# Patient Record
Sex: FEMALE | Race: WHITE | Employment: FULL TIME | URBAN - METROPOLITAN AREA
[De-identification: names, ages, dates, MRNs, and addresses within clinical notes are randomized per-mention and may not be internally consistent; named-entity substitution may affect disease eponyms.]

---

## 2014-10-01 LAB — COLONOSCOPY, EXTERNAL: NORMAL

## 2017-01-26 ENCOUNTER — OFFICE VISIT (OUTPATIENT)
Dept: FAMILY MEDICINE CLINIC | Age: 57
End: 2017-01-26

## 2017-01-26 VITALS
HEIGHT: 61 IN | TEMPERATURE: 98.9 F | DIASTOLIC BLOOD PRESSURE: 72 MMHG | BODY MASS INDEX: 29.07 KG/M2 | SYSTOLIC BLOOD PRESSURE: 137 MMHG | WEIGHT: 154 LBS | RESPIRATION RATE: 12 BRPM | HEART RATE: 80 BPM | OXYGEN SATURATION: 100 %

## 2017-01-26 DIAGNOSIS — J01.00 ACUTE NON-RECURRENT MAXILLARY SINUSITIS: Primary | ICD-10-CM

## 2017-01-26 DIAGNOSIS — H10.33 ACUTE CONJUNCTIVITIS OF BOTH EYES, UNSPECIFIED ACUTE CONJUNCTIVITIS TYPE: ICD-10-CM

## 2017-01-26 DIAGNOSIS — R05.9 COUGH: ICD-10-CM

## 2017-01-26 RX ORDER — POLYMYXIN B SULFATE AND TRIMETHOPRIM 1; 10000 MG/ML; [USP'U]/ML
1 SOLUTION OPHTHALMIC EVERY 4 HOURS
Qty: 1 BOTTLE | Refills: 0 | Status: SHIPPED | OUTPATIENT
Start: 2017-01-26 | End: 2017-02-05

## 2017-01-26 RX ORDER — CEPHALEXIN 500 MG/1
500 CAPSULE ORAL 2 TIMES DAILY
Qty: 20 CAP | Refills: 0 | Status: SHIPPED | OUTPATIENT
Start: 2017-01-26 | End: 2017-02-05

## 2017-01-26 RX ORDER — BENZONATATE 200 MG/1
200 CAPSULE ORAL
Qty: 30 CAP | Refills: 0 | Status: SHIPPED | OUTPATIENT
Start: 2017-01-26 | End: 2017-05-30

## 2017-01-26 RX ORDER — DESONIDE 0.5 MG/G
OINTMENT TOPICAL 2 TIMES DAILY
COMMUNITY

## 2017-01-26 NOTE — PATIENT INSTRUCTIONS
Pinkeye: Care Instructions  Your Care Instructions    Pinkeye is redness and swelling of the eye surface and the conjunctiva (the lining of the eyelid and the covering of the white part of the eye). Pinkeye is also called conjunctivitis. Pinkeye is often caused by infection with bacteria or a virus. Dry air, allergies, smoke, and chemicals are other common causes. Pinkeye often clears on its own in 7 to 10 days. Antibiotics only help if the pinkeye is caused by bacteria. Pinkeye caused by infection spreads easily. If an allergy or chemical is causing pinkeye, it will not go away unless you can avoid whatever is causing it. Follow-up care is a key part of your treatment and safety. Be sure to make and go to all appointments, and call your doctor if you are having problems. Its also a good idea to know your test results and keep a list of the medicines you take. How can you care for yourself at home? · Wash your hands often. Always wash them before and after you treat pinkeye or touch your eyes or face. · Use moist cotton or a clean, wet cloth to remove crust. Wipe from the inside corner of the eye to the outside. Use a clean part of the cloth for each wipe. · Put cold or warm wet cloths on your eye a few times a day if the eye hurts. · Do not wear contact lenses or eye makeup until the pinkeye is gone. Throw away any eye makeup you were using when you got pinkeye. Clean your contacts and storage case. If you wear disposable contacts, use a new pair when your eye has cleared and it is safe to wear contacts again. · If the doctor gave you antibiotic ointment or eyedrops, use them as directed. Use the medicine for as long as instructed, even if your eye starts looking better soon. Keep the bottle tip clean, and do not let it touch the eye area. · To put in eyedrops or ointment:  ¨ Tilt your head back, and pull your lower eyelid down with one finger.   ¨ Drop or squirt the medicine inside the lower lid.  ¨ Close your eye for 30 to 60 seconds to let the drops or ointment move around. ¨ Do not touch the ointment or dropper tip to your eyelashes or any other surface. · Do not share towels, pillows, or washcloths while you have pinkeye. When should you call for help? Call your doctor now or seek immediate medical care if:  · You have pain in your eye, not just irritation on the surface. · You have a change in vision or loss of vision. · You have an increase in discharge from the eye. · Your eye has not started to improve or begins to get worse within 48 hours after you start using antibiotics. · Pinkeye lasts longer than 7 days. Watch closely for changes in your health, and be sure to contact your doctor if you have any problems. Where can you learn more? Go to http://anabelle-wanda.info/. Enter Y392 in the search box to learn more about \"Pinkeye: Care Instructions. \"  Current as of: May 27, 2016  Content Version: 11.1  © 3840-4963 Healthwise, Incorporated. Care instructions adapted under license by [employer] (which disclaims liability or warranty for this information). If you have questions about a medical condition or this instruction, always ask your healthcare professional. [address] any warranty or liability for your use of this information.

## 2017-01-26 NOTE — PROGRESS NOTES
Here for (L) eye redness since with drainage, irritation has spread to her (R) eye. Is also getting over a URI.

## 2017-01-26 NOTE — MR AVS SNAPSHOT
Visit Information Date & Time Provider Department Dept. Phone Encounter #  
 1/26/2017  3:30 PM Adamaris Morrell NP [address] [facility] [phone] [id number] Follow-up Instructions Return if symptoms worsen or fail to improve. Your Appointments 3/14/2017  9:00 AM  
Any with Latanya Galvez MD  
[address] [facility] Appt Note: 1 yr follow up, bring machine [employer] Himanshu Wynn [id number] [address] [facility] [id number] 5/30/2017  8:30 AM  
ESTABLISHED PATIENT with Willie Martinez MD  
[address] [facility]) Appt Note: 6mo fuv  
 [address] [id number] [phone]  
  
   
 [address] [address] Upcoming Health Maintenance Date Due Pneumococcal 19-64 Medium Risk (1 of 1 - PPSV23) 2/15/1980 DTaP/Tdap/Td series (1 - Tdap) 2/15/1982 EYE EXAM RETINAL OR DILATED Q1 5/28/2016 PAP AKA CERVICAL CYTOLOGY 6/1/2016 BREAST CANCER SCRN MAMMOGRAM 1/6/2017 HEMOGLOBIN A1C Q6M 5/28/2017 FOOT EXAM Q1 11/28/2017 MICROALBUMIN Q1 11/28/2017 LIPID PANEL Q1 11/28/2017 COLONOSCOPY 12/1/2024 Allergies as of 1/26/2017  Review Complete On: 1/26/2017 By: Sabrina Sellers LPN Severity Noted Reaction Type Reactions Amoxicillin High 06/14/2010    Anaphylaxis Empirin  06/14/2010    Other (comments) Numbness and tingling in extremities Lyrica [Pregabalin]  06/14/2010    Other (comments) Caused muenstral cycle to be sporadic Mepergan  06/14/2010    Other (comments) Numbness and tingling of extremities Meperidine  06/14/2010    Other (comments) Numbness and tingling of extremities Promethazine  06/14/2010    Not Reported This Time  
 Numbness and tingling of extremities Asa Ka [Milnacipran]  03/31/2011    Palpitations Current Immunizations  Reviewed on 5/25/2016 No immunizations on file. Not reviewed this visit You Were Diagnosed With   
  
 Codes Comments Acute non-recurrent maxillary sinusitis    -  Primary ICD-10-CM: J01.00 ICD-9-CM: 461.0 Acute conjunctivitis of both eyes, unspecified acute conjunctivitis type     ICD-10-CM: H10.33 ICD-9-CM: 372.00 Cough     ICD-10-CM: R05 ICD-9-CM: 818. 2 Vitals BP Pulse Temp Resp Height(growth percentile) Weight(growth percentile)  
 137/72 80 98.9 °F (37.2 °C) 12 5' 1\" (1.549 m) 154 lb (69.9 kg) LMP SpO2 BMI OB Status Smoking Status 05/07/2015 100% 29.1 kg/m2 Hysterectomy Never Smoker Vitals History BMI and BSA Data Body Mass Index Body Surface Area  
 29.1 kg/m 2 1.73 m 2 Preferred Pharmacy Pharmacy Name Phone [id number] [place], [address] [facility] [phone] Your Updated Medication List  
  
   
This list is accurate as of: 1/26/17  4:13 PM.  Always use your most recent med list.  
  
  
  
  
 albuterol 90 mcg/actuation inhaler Commonly known as:  PROVENTIL HFA, VENTOLIN HFA, PROAIR HFA Take 2 Puffs by inhalation every four (4) hours as needed for Wheezing for up to 360 days. benzonatate 200 mg capsule Commonly known as:  TESSALON Take 1 Cap by mouth three (3) times daily as needed for Cough. cephALEXin 500 mg capsule Commonly known as:  Amedeo Ronde Take 1 Cap by mouth two (2) times a day for 10 days. cetirizine 10 mg tablet Commonly known as:  ZYRTEC  
TAKE 1 TABLET BY MOUTH DAILY. clindinium-chlordiazePOXIDE 5-2.5 mg per capsule Commonly known as:  Abi Fears Take 1 Cap by mouth two (2) times a day. Max Daily Amount: 2 Caps. clobetasol 0.05 % external solution Commonly known as:  TEMOVATE  
APPLY TO AFFECTED AREA TWICE A DAY FOR 30 DAYS DISCARD REMAINDER  
  
 COSAMIN -400 mg tablet Generic drug:  glucosamine-chondroitin Take 1 Tab by mouth two (2) times a day. desonide 0.05 % topical ointment Commonly known as:  Francisco Augustine Apply  to affected area two (2) times a day. escitalopram oxalate 20 mg tablet Commonly known as:  Wyn Jacque Take 1 Tab by mouth daily. FISH OIL PO Take  by mouth. GLUCOSAMINE RELIEF 1,000 mg Tab Generic drug:  glucosamine Take  by mouth.  
  
 metaxalone 800 mg tablet Commonly known as:  SKELAXIN Take 1 Tab by mouth three (3) times daily. MIRALAX PO Take  by mouth nightly. pantoprazole 40 mg tablet Commonly known as:  PROTONIX Take 1 Tab by mouth daily. salicylic acid 6 % Sham  
APPLY EXTERNALLY DAILY. TACLONEX 0.005-0.064 % Susp Generic drug:  Betamethasone-Calcipotriene  
  
 tamoxifen 20 mg tablet Commonly known as:  NOLVADEX TAKE 1 TAB BY MOUTH DAILY. triamcinolone acetonide 0.1 % ointment Commonly known as:  KENALOG Apply  to affected area two (2) times a day. use thin layer  
  
 trimethoprim-polymyxin b ophthalmic solution Commonly known as:  POLYTRIM Administer 1 Drop to both eyes every four (4) hours for 10 days. turmeric-turmeric root extract 450-50 mg Cap Take  by mouth.  
  
 valsartan-hydroCHLOROthiazide 160-12.5 mg per tablet Commonly known as:  DIOVAN-HCT Take 1 Tab by mouth daily. VITAMIN D3 2,000 unit Tab Generic drug:  cholecalciferol (vitamin D3) Take  by mouth. Prescriptions Sent to Pharmacy Refills  
 trimethoprim-polymyxin b (POLYTRIM) ophthalmic solution 0 Sig: Administer 1 Drop to both eyes every four (4) hours for 10 days. Class: Normal  
 Pharmacy: [facility]/pharmacy #[id number] [address] Ph #: [phone] Route: Both Eyes  
 benzonatate (TESSALON) 200 mg capsule 0 Sig: Take 1 Cap by mouth three (3) times daily as needed for Cough. Class: Normal  
 Pharmacy: [facility]/pharmacy #[id number] - [place], [address] [place] [address] Ph #: [phone] Route: Oral  
 cephALEXin (KEFLEX) 500 mg capsule 0 Sig: Take 1 Cap by mouth two (2) times a day for 10 days. Class: Normal  
 Pharmacy: [facility]/pharmacy #[id number] - [place], [address] [facility] #: [phone] Route: Oral  
  
Follow-up Instructions Return if symptoms worsen or fail to improve. Patient Instructions Pinkeye: Care Instructions Your Care Instructions Pinkeye is redness and swelling of the eye surface and the conjunctiva (the lining of the eyelid and the covering of the white part of the eye). Pinkeye is also called conjunctivitis. Pinkeye is often caused by infection with bacteria or a virus. Dry air, allergies, smoke, and chemicals are other common causes. Pinkeye often clears on its own in 7 to 10 days. Antibiotics only help if the pinkeye is caused by bacteria. Pinkeye caused by infection spreads easily. If an allergy or chemical is causing pinkeye, it will not go away unless you can avoid whatever is causing it. Follow-up care is a key part of your treatment and safety. Be sure to make and go to all appointments, and call your doctor if you are having problems. Its also a good idea to know your test results and keep a list of the medicines you take. How can you care for yourself at home? · Wash your hands often. Always wash them before and after you treat pinkeye or touch your eyes or face. · Use moist cotton or a clean, wet cloth to remove crust. Wipe from the inside corner of the eye to the outside. Use a clean part of the cloth for each wipe. · Put cold or warm wet cloths on your eye a few times a day if the eye hurts. · Do not wear contact lenses or eye makeup until the pinkeye is gone. Throw away any eye makeup you were using when you got pinkeye. Clean your contacts and storage case. If you wear disposable contacts, use a new pair when your eye has cleared and it is safe to wear contacts again. · If the doctor gave you antibiotic ointment or eyedrops, use them as directed. Use the medicine for as long as instructed, even if your eye starts looking better soon. Keep the bottle tip clean, and do not let it touch the eye area. · To put in eyedrops or ointment: ¨ Tilt your head back, and pull your lower eyelid down with one finger. ¨ Drop or squirt the medicine inside the lower lid. ¨ Close your eye for 30 to 60 seconds to let the drops or ointment move around. ¨ Do not touch the ointment or dropper tip to your eyelashes or any other surface. · Do not share towels, pillows, or washcloths while you have pinkeye. When should you call for help? Call your doctor now or seek immediate medical care if: 
· You have pain in your eye, not just irritation on the surface. · You have a change in vision or loss of vision. · You have an increase in discharge from the eye. · Your eye has not started to improve or begins to get worse within 48 hours after you start using antibiotics. · Pinkeye lasts longer than 7 days. Watch closely for changes in your health, and be sure to contact your doctor if you have any problems. Where can you learn more? Go to http://anabelle-wanda.info/. Enter Y392 in the search box to learn more about \"Pinkeye: Care Instructions. \" Current as of: May 27, 2016 Content Version: 11.1 © 4064-0076 [employer]. Care instructions adapted under license by [employer] (which disclaims liability or warranty for this information). If you have questions about a medical condition or this instruction, always ask your healthcare professional. [address] any warranty or liability for your use of this information. Introducing [facility] & HEALTH SERVICES! Dear Ari Lambert: Thank you for requesting a Space-Time Insight account. Our records indicate that you already have an active Space-Time Insight account.   You can access your account anytime at [email or web address] Did you know that you can access your hospital and ER discharge instructions at any time in Brandkids? You can also review all of your test results from your hospital stay or ER visit. Additional Information If you have questions, please visit the Frequently Asked Questions section of the Brandkids website at [email or web address] Remember, Brandkids is NOT to be used for urgent needs. For medical emergencies, dial 911. Now available from your iPhone and Android! Please provide this summary of care documentation to your next provider. Your primary care clinician is listed as RODRIGO FERNANDEZ. If you have any questions after today's visit, please call [phone].

## 2017-02-06 LAB — AMB DEXA, EXTERNAL: NORMAL

## 2017-03-06 RX ORDER — ESCITALOPRAM OXALATE 10 MG/1
10 TABLET ORAL DAILY
Qty: 90 TAB | Refills: 1 | Status: SHIPPED | OUTPATIENT
Start: 2017-03-06 | End: 2017-09-06 | Stop reason: SDUPTHER

## 2017-03-14 ENCOUNTER — OFFICE VISIT (OUTPATIENT)
Dept: SLEEP MEDICINE | Age: 57
End: 2017-03-14

## 2017-03-14 ENCOUNTER — DOCUMENTATION ONLY (OUTPATIENT)
Dept: SLEEP MEDICINE | Age: 57
End: 2017-03-14

## 2017-03-14 VITALS
SYSTOLIC BLOOD PRESSURE: 120 MMHG | DIASTOLIC BLOOD PRESSURE: 71 MMHG | HEIGHT: 61 IN | BODY MASS INDEX: 29.45 KG/M2 | OXYGEN SATURATION: 97 % | WEIGHT: 156 LBS | HEART RATE: 80 BPM

## 2017-03-14 DIAGNOSIS — I10 ESSENTIAL HYPERTENSION: ICD-10-CM

## 2017-03-14 DIAGNOSIS — G47.33 OBSTRUCTIVE SLEEP APNEA (ADULT) (PEDIATRIC): Primary | ICD-10-CM

## 2017-03-14 RX ORDER — ESCITALOPRAM OXALATE 20 MG/1
TABLET ORAL
Refills: 5 | COMMUNITY
Start: 2016-12-05 | End: 2017-05-30

## 2017-03-14 NOTE — PROGRESS NOTES
[address] [facility] [place]. [place], [address]  Tel.  [phone]  Fax. 100 [facility] 60  [place], [address] [facility]  Tel.  [phone]  Fax. [phone] [address] [facility] Cosme Roman   Tel.  [phone]  Fax. [phone]     S>Goldie Abel is a 65 y.o. female seen for a positive airway pressure follow-up. She reports no problems using the device. She is 98% compliant over the past 6 months. The following problems are identified:    Drowsiness no Problems exhaling no   Snoring no Forget to put on no   Mask Comfortable No, still hurts nose Can't fall asleep no   Dry Mouth no Mask falls off no   Air Leaking no Frequent awakenings no      Download reviewed. She admits that her sleep has improved. However, still achy and fatigued which she attributes to the fibromyalgia. Allergies   Allergen Reactions    Amoxicillin Anaphylaxis    Empirin Other (comments)     Numbness and tingling in extremities    Lyrica [Pregabalin] Other (comments)     Caused muenstral cycle to be sporadic    Mepergan Other (comments)     Numbness and tingling of extremities    Meperidine Other (comments)     Numbness and tingling of extremities    Promethazine Not Reported This Time     Numbness and tingling of extremities    Savella [Milnacipran] Palpitations       She has a current medication list which includes the following prescription(s): escitalopram oxalate, desonide, clindinium-chlordiazepoxide, triamcinolone acetonide, turmeric-turmeric root extract, salicylic acid, clobetasol, pantoprazole, docosahexanoic acid/epa, tamoxifen, valsartan-hydrochlorothiazide, taclonex, metaxalone, escitalopram oxalate, benzonatate, cholecalciferol (vitamin d3), glucosamine-chondroitin, glucosamine, cetirizine, albuterol, and polyethylene glycol [address]. [facility] She  has a past medical history of Arthritis; Breast atypical hyperplasia (6/14/2010);  Chronic pain; DM (diabetes mellitus) ([facility] [address] (6/14/2010); Fibromyalgia (6/14/2010); GERD (gastroesophageal reflux disease); HTN (hypertension) (6/14/2010); IBS (irritable bowel syndrome) (6/14/2010); Ill-defined condition; MVP (mitral valve prolapse) (6/14/2010); and Retinal detachment. [place] Sleepiness Score: 17   and Modified F.O.S.Q. Score Total / 2: 12      O>    Visit Vitals    /71    Pulse 80    Ht 5' 1\" (1.549 m)    Wt 156 lb (70.8 kg)    LMP 05/07/2015    SpO2 97%    BMI 29.48 kg/m2           General:   Alert, oriented, not in distress   Neck:   No JVD    Chest/Lungs:  symetrical lung expansion , no accessory muscle use    Extremities:  no obvious rashes , negative edema    Neuro:  No focal deficits ; No obvious tremor    Psych:  Normal affect ,  Normal countenance ;         A>    ICD-10-CM ICD-9-CM    1. Obstructive sleep apnea (adult) (pediatric) G47.33 327.23 AMB SUPPLY ORDER     AHI = 10(12-15). On CPAP :  5-10 cmH2O. Compliant:      yes    Therapeutic Response:  Positive    P>      * Follow-up Disposition:  Return in about 1 year (around 3/14/2018). she will continue on her current pressure settings. dme for mask fitting and replacement supplies. * She was asked to contact our office for any problems regarding PAP therapy. * Counseling was provided regarding the importance of regular PAP use and on proper sleep hygiene and safe driving. * Re-enforced proper and regular cleaning for the device. 2. Hypertension - she continues on her current regimen. I have reviewed the relationship between hypertension as it relates to sleep-disordered breathing.      Lawyer Angelo MD  Diplomate in Sleep Medicine  [facility]

## 2017-03-14 NOTE — PATIENT INSTRUCTIONS
[address] [facility] [place]. [place], [address]  Tel.  [phone]  Fax. 100 [facility] 60  [place], [address] [facility] [address]  Tel.  [phone]  Fax. [phone] [address] [facility] Cosme Roman  Tel.  [phone]  Fax. [phone]     PROPER SLEEP HYGIENE    What to avoid  · Do not have drinks with caffeine, such as coffee or black tea, for 8 hours before bed. · Do not smoke or use other types of tobacco near bedtime. Nicotine is a stimulant and can keep you awake. · Avoid drinking alcohol late in the evening, because it can cause you to wake in the middle of the night. · Do not eat a big meal close to bedtime. If you are hungry, eat a light snack. · Do not drink a lot of water close to bedtime, because the need to urinate may wake you up during the night. · Do not read or watch TV in bed. Use the bed only for sleeping and sexual activity. What to try  · Go to bed at the same time every night, and wake up at the same time every morning. Do not take naps during the day. · Keep your bedroom quiet, dark, and cool. · Get regular exercise, but not within 3 to 4 hours of your bedtime. .  · Sleep on a comfortable pillow and mattress. · If watching the clock makes you anxious, turn it facing away from you so you cannot see the time. · If you worry when you lie down, start a worry book. Well before bedtime, write down your worries, and then set the book and your concerns aside. · Try meditation or other relaxation techniques before you go to bed. · If you cannot fall asleep, get up and go to another room until you feel sleepy. Do something relaxing. Repeat your bedtime routine before you go to bed again. · Make your house quiet and calm about an hour before bedtime. Turn down the lights, turn off the TV, log off the computer, and turn down the volume on music. This can help you relax after a busy day.     Drowsy Driving  The [address] [place] [address] Road Traffic Safety Administration cites drowsiness as a causing factor in more than 457,547 police reported crashes annually, resulting in 76,000 injuries and 1,500 deaths. Other surveys suggest 55% of people polled have driven while drowsy in the past year, 23% had fallen asleep but not crashed, 3% crashed, and 2% had and accident due to drowsy driving. Who is at risk? Young Drivers: One study of drowsy driving accidents states that 55% of the drivers were under 25 years. Of those, 75% were male. Shift Workers and Travelers: People who work overnight or travel across time zones frequently are at higher risk of experiencing Circadian Rhythm Disorders. They are trying to work and function when their body is programed to sleep. Sleep Deprived: Lack of sleep has a serious impact on your ability to pay attention or focus on a task. Consistently getting less than the average of 8 hours your body needs creates partial or cumulative sleep deprivation. Untreated Sleep Disorders: Sleep Apnea, Narcolepsy, R.L.S., and other sleep disorders (untreated) prevent a person from getting enough restful sleep. This leads to excessive daytime sleepiness and increases the risk for drowsy driving accidents by up to 7 times. Medications / Alcohol: Even over the counter medications can cause drowsiness. Medications that impair a drivers attention should have a warning label. Alcohol naturally makes you sleepy and on its own can cause accidents. Combined with excessive drowsiness its effects are amplified. Signs of Drowsy Driving:   * You don't remember driving the last few miles   * You may drift out of your vanda   * You are unable to focus and your thoughts wander   * You may yawn more often than normal   * You have difficulty keeping your eyes open / nodding off   * Missing traffic signs, speeding, or tailgating  Prevention-   Good sleep hygiene, lifestyle and behavioral choices have the most impact on drowsy driving.  There is no substitute for sleep and the average person requires 8 hours nightly. If you find yourself driving drowsy, stop and sleep. Consider the sleep hygiene tips provided during your visit as well. Medication Refill Policy: Refills for all medications require 1 week advance notice. Please have your pharmacy fax a refill request. We are unable to fax, or call in \"controled substance\" medications and you will need to pick these prescriptions up from our office. [employer] Activation    Thank you for requesting access to PinkUP. Please follow the instructions below to securely access and download your online medical record. PinkUP allows you to send messages to your doctor, view your test results, renew your prescriptions, schedule appointments, and more. How Do I Sign Up? 1. In your internet browser, go to [email or web address]. 2. Click on the First Time User? Click Here link in the Sign In box. You will see the New Member Sign Up page. 3. Enter your PinkUP Access Code exactly as it appears below. You will not need to use this code after youve completed the sign-up process. If you do not sign up before the expiration date, you must request a new code. PinkUP Access Code: Activation code not generated  Current PinkUP Status: Active (This is the date your PinkUP access code will )    4. Enter the last four digits of your Social Security Number (xxxx) and Date of Birth (mm/dd/yyyy) as indicated and click Submit. You will be taken to the next sign-up page. 5. Create a PinkUP ID. This will be your PinkUP login ID and cannot be changed, so think of one that is secure and easy to remember. 6. Create a PinkUP password. You can change your password at any time. 7. Enter your Password Reset Question and Answer. This can be used at a later time if you forget your password. 8. Enter your e-mail address. You will receive e-mail notification when new information is available in [address]   9. Click Sign Up. You can now view and download portions of your medical record. 10. Click the Download Summary menu link to download a portable copy of your medical information. Additional Information    If you have questions, please call [phone]. Remember, [employer] is NOT to be used for urgent needs. For medical emergencies, dial 911.

## 2017-04-20 RX ORDER — PANTOPRAZOLE SODIUM 40 MG/1
TABLET, DELAYED RELEASE ORAL
Qty: 90 TAB | Refills: 1 | Status: SHIPPED | OUTPATIENT
Start: 2017-04-20 | End: 2017-11-08 | Stop reason: SDUPTHER

## 2017-05-30 ENCOUNTER — OFFICE VISIT (OUTPATIENT)
Dept: FAMILY MEDICINE CLINIC | Age: 57
End: 2017-05-30

## 2017-05-30 VITALS
TEMPERATURE: 98.4 F | SYSTOLIC BLOOD PRESSURE: 124 MMHG | OXYGEN SATURATION: 98 % | WEIGHT: 153 LBS | HEIGHT: 61 IN | RESPIRATION RATE: 18 BRPM | BODY MASS INDEX: 28.89 KG/M2 | HEART RATE: 70 BPM | DIASTOLIC BLOOD PRESSURE: 76 MMHG

## 2017-05-30 DIAGNOSIS — R73.03 PRE-DIABETES: ICD-10-CM

## 2017-05-30 DIAGNOSIS — I10 ESSENTIAL HYPERTENSION: Primary | ICD-10-CM

## 2017-05-30 RX ORDER — CLOBETASOL PROPIONATE 0.46 MG/ML
SOLUTION TOPICAL
Qty: 50 ML | Refills: 5 | Status: SHIPPED | OUTPATIENT
Start: 2017-05-30 | End: 2019-01-04 | Stop reason: SDUPTHER

## 2017-05-30 NOTE — MR AVS SNAPSHOT
Visit Information Date & Time Provider Department Dept. Phone Encounter #  
 5/30/2017  8:30 AM Samina Brooks MD [address] [facility] [phone] [id number] Follow-up Instructions Return in about 6 months (around 11/30/2017). Upcoming Health Maintenance Date Due Pneumococcal 19-64 Medium Risk (1 of 1 - PPSV23) 2/15/1980 DTaP/Tdap/Td series (1 - Tdap) 2/15/1982 EYE EXAM RETINAL OR DILATED Q1 5/28/2016 PAP AKA CERVICAL CYTOLOGY 6/1/2016 BREAST CANCER SCRN MAMMOGRAM 1/6/2017 HEMOGLOBIN A1C Q6M 5/28/2017 INFLUENZA AGE 9 TO ADULT 8/1/2017 FOOT EXAM Q1 11/28/2017 MICROALBUMIN Q1 11/28/2017 LIPID PANEL Q1 11/28/2017 COLONOSCOPY 12/1/2024 Allergies as of 5/30/2017  Review Complete On: 5/30/2017 By: Samina Brooks MD  
  
 Severity Noted Reaction Type Reactions Amoxicillin High 06/14/2010    Anaphylaxis Empirin  06/14/2010    Other (comments) Numbness and tingling in extremities Lyrica [Pregabalin]  06/14/2010    Other (comments) Caused muenstral cycle to be sporadic Mepergan  06/14/2010    Other (comments) Numbness and tingling of extremities Meperidine  06/14/2010    Other (comments) Numbness and tingling of extremities Promethazine  06/14/2010    Not Reported This Time  
 Numbness and tingling of extremities Jeff Krum [Milnacipran]  03/31/2011    Palpitations Current Immunizations  Reviewed on 5/25/2016 No immunizations on file. Not reviewed this visit You Were Diagnosed With   
  
 Codes Comments Essential hypertension    -  Primary ICD-10-CM: I10 
ICD-9-CM: 401.9 Pre-diabetes     ICD-10-CM: R73.03 
ICD-9-CM: 790.29 Vitals BP Pulse Temp Resp Height(growth percentile) Weight(growth percentile) 124/76 (BP 1 Location: Left arm, BP Patient Position: Sitting) 70 98.4 °F (36.9 °C) (Oral) 18 5' 1\" (1.549 m) 153 lb (69.4 kg) LMP SpO2 BMI OB Status Smoking Status 05/07/2015 98% 28.91 kg/m2 Hysterectomy Never Smoker Vitals History BMI and BSA Data Body Mass Index Body Surface Area  
 28.91 kg/m 2 1.73 m 2 Preferred Pharmacy Pharmacy Name Phone [id number] [address] [phone] Your Updated Medication List  
  
   
This list is accurate as of: 5/30/17  8:56 AM.  Always use your most recent med list.  
  
  
  
  
 cetirizine 10 mg tablet Commonly known as:  ZYRTEC  
TAKE 1 TABLET BY MOUTH DAILY. clindinium-chlordiazePOXIDE 5-2.5 mg per capsule Commonly known as:  Dewain Shack Take 1 Cap by mouth two (2) times a day. Max Daily Amount: 2 Caps. clobetasol 0.05 % external solution Commonly known as:  TEMOVATE  
APPLY TO AFFECTED AREA TWICE A DAY FOR 30 DAYS DISCARD REMAINDER  
  
 COSAMIN -400 mg tablet Generic drug:  glucosamine-chondroitin Take 1 Tab by mouth two (2) times a day. desonide 0.05 % topical ointment Commonly known as:  Amber Russ Apply  to affected area two (2) times a day. escitalopram oxalate 10 mg tablet Commonly known as:  Cate Tanya Take 1 Tab by mouth daily. FISH OIL PO Take  by mouth. GLUCOSAMINE RELIEF 1,000 mg Tab Generic drug:  glucosamine Take  by mouth.  
  
 metaxalone 800 mg tablet Commonly known as:  SKELAXIN Take 1 Tab by mouth three (3) times daily. MIRALAX PO Take  by mouth nightly. pantoprazole 40 mg tablet Commonly known as:  PROTONIX  
TAKE 1 TAB BY MOUTH DAILY. salicylic acid 6 % Sham  
APPLY EXTERNALLY DAILY. TACLONEX 0.005-0.064 % Susp Generic drug:  Betamethasone-Calcipotriene  
  
 tamoxifen 20 mg tablet Commonly known as:  NOLVADEX TAKE 1 TAB BY MOUTH DAILY. triamcinolone acetonide 0.1 % ointment Commonly known as:  KENALOG Apply  to affected area two (2) times a day. use thin layer  
  
 turmeric-turmeric root extract 450-50 mg Cap Take  by mouth. valsartan-hydroCHLOROthiazide 160-12.5 mg per tablet Commonly known as:  DIOVAN-HCT Take 1 Tab by mouth daily. VITAMIN D3 2,000 unit Tab Generic drug:  cholecalciferol (vitamin D3) Take  by mouth. Prescriptions Sent to Pharmacy Refills  
 clobetasol (TEMOVATE) 0.05 % external solution 5 Sig: APPLY TO AFFECTED AREA TWICE A DAY FOR 30 DAYS DISCARD REMAINDER Class: Normal  
 Pharmacy: [id number] [place], [address] Ph #: [phone] We Performed the Following HEMOGLOBIN A1C WITH EAG [08819 CPT(R)] LIPID PANEL [17629 CPT(R)] METABOLIC PANEL, COMPREHENSIVE [67226 CPT(R)] Follow-up Instructions Return in about 6 months (around 11/30/2017). Introducing [facility]! Dear Caitie Rodriguez: Thank you for requesting a Room n House account. Our records indicate that you already have an active Room n House account. You can access your account anytime at [email or web address] Did you know that you can access your hospital and ER discharge instructions at any time in Room n House? You can also review all of your test results from your hospital stay or ER visit. Additional Information If you have questions, please visit the Frequently Asked Questions section of the Room n House website at [email or web address] Remember, Room n House is NOT to be used for urgent needs. For medical emergencies, dial 911. Now available from your iPhone and Android! Please provide this summary of care documentation to your next provider. Your primary care clinician is listed as RODRIGO FERNANDEZ. If you have any questions after today's visit, please call [phone].

## 2017-05-30 NOTE — PROGRESS NOTES
1. Have you been to the ER, urgent care clinic since your last visit? Hospitalized since your last visit? No    2. Have you seen or consulted any other health care providers outside of the [address] [place] [address] since your last visit? Include any pap smears or colon screening. No       Chief Complaint   Patient presents with    Diabetes     Pre diabetes fuv           Chief Complaint   Patient presents with    Diabetes     Pre diabetes fuv     she is a 65y.o. year old female who presents for evalution. Reviewed PmHx, RxHx, FmHx, SocHx, AllgHx and updated and dated in the chart. Patient Active Problem List    Diagnosis    Psoriasis    STEPHANIE (obstructive sleep apnea)    Esophageal stricture    DCIS (ductal carcinoma in situ)     2/2015 RIGHT lumpectomy for 6 mm DCIS grade 2, %. %      Pre-diabetes    Vitamin D deficiency    GERD (gastroesophageal reflux disease)    Depressive disorder, not elsewhere classified    Insomnia    Fibromyalgia    HTN (hypertension)    MVP (mitral valve prolapse)    IBS (irritable bowel syndrome)       Review of Systems - negative except as listed above in the HPI    Objective:     Vitals:    05/30/17 0837   BP: 124/76   Pulse: 70   Resp: 18   Temp: 98.4 °F (36.9 °C)   TempSrc: Oral   SpO2: 98%   Weight: 153 lb (69.4 kg)   Height: 5' 1\" (1.549 m)     Physical Examination: General appearance - alert, well appearing, and in no distress  Neck - supple, no significant adenopathy  Chest - clear to auscultation, no wheezes, rales or rhonchi, symmetric air entry  Heart - normal rate, regular rhythm, normal S1, S2, no murmurs, rubs, clicks or gallops    Assessment/ Plan:   Lory Loving was seen today for diabetes.     Diagnoses and all orders for this visit:    Essential hypertension  -     LIPID PANEL  -     METABOLIC PANEL, COMPREHENSIVE  -at goal    Pre-diabetes  -     METABOLIC PANEL, COMPREHENSIVE  -     HEMOGLOBIN A1C WITH EAG  Last A1C was 6    Other orders  - clobetasol (TEMOVATE) 0.05 % external solution; APPLY TO AFFECTED AREA TWICE A DAY FOR 30 DAYS DISCARD REMAINDER       Follow-up Disposition:  Return in about 6 months (around 11/30/2017). I have discussed the diagnosis with the patient and the intended plan as seen in the above orders. The patient understands and agrees with the plan. The patient has received an after-visit summary and questions were answered concerning future plans. Medication Side Effects and Warnings were discussed with patient  Patient Labs were reviewed and or requested:  Patient Past Records were reviewed and or requested    Bassam Yan M.D. There are no Patient Instructions on file for this visit.

## 2017-05-31 LAB
ALBUMIN SERPL-MCNC: 4.2 G/DL (ref 3.5–5.5)
ALBUMIN/GLOB SERPL: 1.8 {RATIO} (ref 1.2–2.2)
ALP SERPL-CCNC: 39 IU/L (ref 39–117)
ALT SERPL-CCNC: 10 IU/L (ref 0–32)
AST SERPL-CCNC: 13 IU/L (ref 0–40)
BILIRUB SERPL-MCNC: <0.2 MG/DL (ref 0–1.2)
BUN SERPL-MCNC: 18 MG/DL (ref 6–24)
BUN/CREAT SERPL: 28 (ref 9–23)
CALCIUM SERPL-MCNC: 9 MG/DL (ref 8.7–10.2)
CHLORIDE SERPL-SCNC: 100 MMOL/L (ref 96–106)
CHOLEST SERPL-MCNC: 150 MG/DL (ref 100–199)
CO2 SERPL-SCNC: 25 MMOL/L (ref 18–29)
CREAT SERPL-MCNC: 0.64 MG/DL (ref 0.57–1)
EST. AVERAGE GLUCOSE BLD GHB EST-MCNC: 117 MG/DL
GLOBULIN SER CALC-MCNC: 2.4 G/DL (ref 1.5–4.5)
GLUCOSE SERPL-MCNC: 94 MG/DL (ref 65–99)
HBA1C MFR BLD: 5.7 % (ref 4.8–5.6)
HDLC SERPL-MCNC: 50 MG/DL
INTERPRETATION, 910389: NORMAL
LDLC SERPL CALC-MCNC: 75 MG/DL (ref 0–99)
POTASSIUM SERPL-SCNC: 3.6 MMOL/L (ref 3.5–5.2)
PROT SERPL-MCNC: 6.6 G/DL (ref 6–8.5)
SODIUM SERPL-SCNC: 143 MMOL/L (ref 134–144)
TRIGL SERPL-MCNC: 127 MG/DL (ref 0–149)
VLDLC SERPL CALC-MCNC: 25 MG/DL (ref 5–40)

## 2017-07-31 RX ORDER — VALSARTAN AND HYDROCHLOROTHIAZIDE 160; 12.5 MG/1; MG/1
TABLET, FILM COATED ORAL
Qty: 90 TAB | Refills: 2 | Status: SHIPPED | OUTPATIENT
Start: 2017-07-31 | End: 2018-05-29

## 2017-08-07 RX ORDER — TAMOXIFEN CITRATE 20 MG/1
TABLET ORAL
Qty: 90 TAB | Refills: 2 | Status: SHIPPED | OUTPATIENT
Start: 2017-08-07 | End: 2018-05-04 | Stop reason: SDUPTHER

## 2017-09-01 ENCOUNTER — TELEPHONE (OUTPATIENT)
Dept: ONCOLOGY | Age: 57
End: 2017-09-01

## 2017-09-01 NOTE — TELEPHONE ENCOUNTER
Called and spoke to pt regarding her fu apt with Dr. Verna Anthony on 9/6/17 at 3:30. Asked her if she would be willing to reschedule to a later date due to overbooking and a potential long wait. Patient stated that was fine but requested around the same time (3:30PM). Patient rescheduled to 10/2/2017 at 2:45PM. Apologized for the inconvenience and she said it was fine.

## 2017-09-06 RX ORDER — ESCITALOPRAM OXALATE 10 MG/1
TABLET ORAL
Qty: 90 TAB | Refills: 1 | Status: SHIPPED | OUTPATIENT
Start: 2017-09-06 | End: 2017-09-17 | Stop reason: SDUPTHER

## 2017-09-18 RX ORDER — ESCITALOPRAM OXALATE 10 MG/1
10 TABLET ORAL DAILY
Qty: 90 TAB | Refills: 1 | Status: SHIPPED | OUTPATIENT
Start: 2017-09-18 | End: 2018-08-30 | Stop reason: SDUPTHER

## 2017-09-18 NOTE — TELEPHONE ENCOUNTER
From: Kristi Andrea  To:  Clayton Waters MD  Sent: 9/17/2017 9:09 PM EDT  Subject: Medication Renewal Request    Original authorizing provider: MD Kristi Rosario would like a refill of the following medications:  escitalopram oxalate (LEXAPRO) 10 mg tablet Clayton Waters MD]    Preferred pharmacy: [address] [place] [address]:

## 2017-10-02 ENCOUNTER — OFFICE VISIT (OUTPATIENT)
Dept: ONCOLOGY | Age: 57
End: 2017-10-02

## 2017-10-02 VITALS
OXYGEN SATURATION: 98 % | SYSTOLIC BLOOD PRESSURE: 122 MMHG | HEIGHT: 61 IN | RESPIRATION RATE: 18 BRPM | TEMPERATURE: 97.1 F | HEART RATE: 78 BPM | WEIGHT: 155.8 LBS | BODY MASS INDEX: 29.42 KG/M2 | DIASTOLIC BLOOD PRESSURE: 62 MMHG

## 2017-10-02 DIAGNOSIS — N89.8 VAGINAL DRYNESS: ICD-10-CM

## 2017-10-02 DIAGNOSIS — D05.11 DUCTAL CARCINOMA IN SITU (DCIS) OF RIGHT BREAST: Primary | ICD-10-CM

## 2017-10-02 DIAGNOSIS — T45.1X5A HOT FLASHES DUE TO TAMOXIFEN: ICD-10-CM

## 2017-10-02 DIAGNOSIS — I10 ESSENTIAL HYPERTENSION: ICD-10-CM

## 2017-10-02 DIAGNOSIS — R23.2 HOT FLASHES DUE TO TAMOXIFEN: ICD-10-CM

## 2017-10-02 NOTE — PROGRESS NOTES
[address], [address] [facility]  [address]  W: [phone]  F: [phone]     F/u HEME/ONC CONSULT    Reason for visit:  evaluation for treatment for DCIS    Consulting physician:  Dr. Melinda Anderson, Dr. Adonis Carter    HPI:   Russ Chery is a 65 y.o.  female who I was asked to see in consultation at the request of Dr. Fay Carrion for evaluation for treatment for DCIS. She had a left breast biopsy in 2009 that showed ADH. She was being followed with mammograms and MRIs and an abnormal MRI led to a biopsy of 2 lesions on the left (benign) and on 12/31/14 on the right showing LCIS and PASH.  2/4/15 excisional biopsy showed DCIS, gr 2, 0.6 cm, ER + at 100%, IL + at 100%. She has declined XRT. She has no complaints today. TV1sFmXl, stage 0. Tamoxifen started 2/23/15  Last GYN: s/p ABBEY  Last eye exam: 10/17    Interval history: complains of gr 1 constipation, gr 2 fatigue, gr 2 hot flashes, gr 1 insomnia, gr 2 pain in joints and muscles. She is taking her tamoxifen every day. DX   Encounter Diagnoses   Name Primary?     Ductal carcinoma in situ (DCIS) of breast, unspecified laterality Yes    Vaginal dryness     Hot flashes due to tamoxifen     Essential hypertension       Past Medical History:   Diagnosis Date    Arthritis     Breast atypical hyperplasia 6/14/2010    LEFT atypical lobular hyperplasia 2009    Chronic pain     fibromyalgia widespread pain    DM (diabetes mellitus) ([facility] [address] 6/14/2010    pre-diabetes no meds    Fibromyalgia 6/14/2010    GERD (gastroesophageal reflux disease)     HTN (hypertension) 6/14/2010    IBS (irritable bowel syndrome) 6/14/2010    Ill-defined condition     esophageal stricture  esophageal diliation 11/2014    MVP (mitral valve prolapse) 6/14/2010    Retinal detachment     Left eye     Past Surgical History:   Procedure Laterality Date    BREAST SURGERY PROCEDURE UNLISTED  2009    Biopsy left    HX BREAST BIOPSY Right 2/4/2015    RIGHT BREAST EXCISIONAL BIOPSY WITH RIGHT NEEDLE LOCALIZATION performed by Chanelle Jarrett MD at [address] HX BREAST LUMPECTOMY      HX CARPAL TUNNEL RELEASE      HX CHOLECYSTECTOMY  18's    HX COLONOSCOPY  10/01/2015    HX GYN      HX ORTHOPAEDIC  2000,2006    carpal tunnel bilateral    HX RETINAL DETACHMENT REPAIR       Social History     Social History    Marital status:      Spouse name: N/A    Number of children: N/A    Years of education: N/A     Social History Main Topics    Smoking status: Never Smoker    Smokeless tobacco: Never Used    Alcohol use No    Drug use: No    Sexual activity: Not Asked     Other Topics Concern    None     Social History Narrative     Family History   Problem Relation Age of Onset    Heart Disease Mother     Diabetes Father     Cancer Son 17     osteosarcoma met to lung    Cancer Maternal Aunt 50     lung      Current Outpatient Prescriptions   Medication Sig Dispense Refill    escitalopram oxalate (LEXAPRO) 10 mg tablet Take 1 Tab by mouth daily. 90 Tab 1    tamoxifen (NOLVADEX) 20 mg tablet TAKE 1 TABLET DAILY 90 Tab 2    valsartan-hydroCHLOROthiazide (DIOVAN-HCT) 160-12.5 mg per tablet TAKE 1 TABLET DAILY 90 Tab 2    clobetasol (TEMOVATE) 0.05 % external solution APPLY TO AFFECTED AREA TWICE A DAY FOR 30 DAYS DISCARD REMAINDER 50 mL 5    pantoprazole (PROTONIX) 40 mg tablet TAKE 1 TAB BY MOUTH DAILY. 90 Tab 1    clindinium-chlordiazePOXIDE (LIBRAX) 5-2.5 mg per capsule Take 1 Cap by mouth two (2) times a day. Max Daily Amount: 2 Caps. 60 Cap 5    triamcinolone acetonide (KENALOG) 0.1 % ointment Apply  to affected area two (2) times a day. use thin layer      cholecalciferol, vitamin D3, (VITAMIN D3) 2,000 unit tab Take  by mouth daily.  turmeric-turmeric root extract 450-50 mg cap Take  by mouth daily.  salicylic acid 6 % sham APPLY EXTERNALLY DAILY.  177 mL 5    TACLONEX 0.005-0.064 % susp   2    desonide (DESOWEN) 0.05 % topical ointment Apply  to affected area two (2) times a day.  cetirizine (ZYRTEC) 10 mg tablet TAKE 1 TABLET BY MOUTH DAILY. 30 Tab 5    metaxalone (SKELAXIN) 800 mg tablet Take 1 Tab by mouth three (3) times daily. 90 Tab 11     Allergies   Allergen Reactions    Amoxicillin Anaphylaxis    Empirin Other (comments)     Numbness and tingling in extremities    Lyrica [Pregabalin] Other (comments)     Caused muenstral cycle to be sporadic    Mepergan Other (comments)     Numbness and tingling of extremities    Meperidine Other (comments)     Numbness and tingling of extremities    Promethazine Not Reported This Time     Numbness and tingling of extremities    Savella [Milnacipran] Palpitations       Review of Systems    A comprehensive review of systems was performed and all systems were negative except for HPI and for the symptom review form, reviewed and scanned in.    Objective:  Physical Exam:  Visit Vitals    /62    Pulse 78    Temp 97.1 °F (36.2 °C) (Temporal)    Resp 18    Ht 5' 1\" (1.549 m)    Wt 155 lb 12.8 oz (70.7 kg)    LMP 05/07/2015    SpO2 98%    BMI 29.44 kg/m2       General:  Alert, cooperative, no distress, appears stated age. Head:  Normocephalic, without obvious abnormality, atraumatic. Eyes:  Conjunctivae/corneas clear. PERRL, EOMs intact. Throat: Lips, mucosa, and tongue normal.        Back:   Symmetric, no curvature. ROM normal. No CVA tenderness. Lungs:   Clear to auscultation bilaterally. Chest wall:  No tenderness or deformity. Heart:  Regular rate and rhythm, S1, S2 normal, no murmur, click, rub or gallop. Abdomen:   Soft, non-tender. Bowel sounds normal. No masses,  No organomegaly. Extremities: Extremities normal, atraumatic, no cyanosis or edema. Skin: Skin color, texture, turgor normal. No rashes or lesions. Neurologic: CNII-XII intact. Diagnostic Imaging   No results found for this or any previous visit. 10/2015 mammogram  negative    Lab Results  Lab Results   Component Value Date/Time    WBC 6.3 01/22/2015 03:27 PM    HGB 13.9 01/22/2015 03:27 PM    HCT 41.3 01/22/2015 03:27 PM    PLATELET 634 23/42/4632 03:27 PM    MCV 91.6 01/22/2015 03:27 PM     Lab Results   Component Value Date/Time    Sodium 143 05/30/2017 09:04 AM    Potassium 3.6 05/30/2017 09:04 AM    Chloride 100 05/30/2017 09:04 AM    CO2 25 05/30/2017 09:04 AM    Anion gap 5 01/22/2015 03:27 PM    Glucose 94 05/30/2017 09:04 AM    BUN 18 05/30/2017 09:04 AM    Creatinine 0.64 05/30/2017 09:04 AM    BUN/Creatinine ratio 28 05/30/2017 09:04 AM    GFR est  05/30/2017 09:04 AM    GFR est non- 05/30/2017 09:04 AM    Calcium 9.0 05/30/2017 09:04 AM    AST (SGOT) 13 05/30/2017 09:04 AM    Alk. phosphatase 39 05/30/2017 09:04 AM    Protein, total 6.6 05/30/2017 09:04 AM    Albumin 4.2 05/30/2017 09:04 AM    A-G Ratio 1.8 05/30/2017 09:04 AM    ALT (SGPT) 10 05/30/2017 09:04 AM     Assessment/Plan:  65 y.o. female with right DCIS, ER +, MD +, gr 2. PS 0    1. DCIS:      No evidence of recurrence, continue tamoxifen. She will continue yearly mammograms. We did discuss the concurrent use of tamoxifen with mild CYP2D6 inhibitors such as lexapro and that in DCIS, no difference in outcomes has been seen with CYP2D6 status. Tamoxifen started 2/23/15. She will complete this in Feb 2020. Mammogram due in October, Dr. Abhishek Martinez will order. She requests that she begin follow up with Dr. Brannon Smith and have him fill her tamoxifen. If he is fine with taking this over I am ok with her doing this. She needs yearly eye exams and yearly mammograms     2. Emotional well being: She is coping well with her disease. 3. Vaginal dryness/irritation/pain with intercourse: d/t tamoxifen, is using replens.   Discussed recent 2015 [employer] article using 4% aqueous lidocaine compress to vulva vestibule for 3 min with cotton ball then immediately apply silicone lubricant liberally and she will discuss with GYN. She has not tried this yet. 4. Hot flashes: Due to tamoxifen, will monitor, discussed gabapentin, she has taken it before and had adverse effects    5. Htn: Controlled today    Thank you for this consult. All of the patient's questions were answered today. > 15 min were spent with this patient with > 50% of that time spent in face to face counseling      There are no Patient Instructions on file for this visit. Follow-up Disposition:  Return if symptoms worsen or fail to improve.     Milagro Graves MD

## 2017-11-09 RX ORDER — PANTOPRAZOLE SODIUM 40 MG/1
TABLET, DELAYED RELEASE ORAL
Qty: 90 TAB | Refills: 1 | Status: SHIPPED | OUTPATIENT
Start: 2017-11-09 | End: 2021-02-04 | Stop reason: ALTCHOICE

## 2017-11-27 ENCOUNTER — OFFICE VISIT (OUTPATIENT)
Dept: FAMILY MEDICINE CLINIC | Age: 57
End: 2017-11-27

## 2017-11-27 ENCOUNTER — TELEPHONE (OUTPATIENT)
Dept: SURGERY | Age: 57
End: 2017-11-27

## 2017-11-27 VITALS
DIASTOLIC BLOOD PRESSURE: 81 MMHG | SYSTOLIC BLOOD PRESSURE: 131 MMHG | TEMPERATURE: 98.5 F | HEART RATE: 84 BPM | OXYGEN SATURATION: 97 % | RESPIRATION RATE: 20 BRPM | BODY MASS INDEX: 29.94 KG/M2 | WEIGHT: 158.56 LBS | HEIGHT: 61 IN

## 2017-11-27 DIAGNOSIS — M54.50 CHRONIC BILATERAL LOW BACK PAIN WITHOUT SCIATICA: ICD-10-CM

## 2017-11-27 DIAGNOSIS — M79.7 FIBROMYALGIA: ICD-10-CM

## 2017-11-27 DIAGNOSIS — E55.9 VITAMIN D DEFICIENCY: ICD-10-CM

## 2017-11-27 DIAGNOSIS — I10 ESSENTIAL HYPERTENSION: Primary | ICD-10-CM

## 2017-11-27 DIAGNOSIS — R73.03 PRE-DIABETES: ICD-10-CM

## 2017-11-27 DIAGNOSIS — G89.29 CHRONIC BILATERAL LOW BACK PAIN WITHOUT SCIATICA: ICD-10-CM

## 2017-11-27 RX ORDER — METAXALONE 800 MG/1
800 TABLET ORAL 3 TIMES DAILY
Qty: 90 TAB | Refills: 11 | Status: SHIPPED | OUTPATIENT
Start: 2017-11-27 | End: 2021-11-15 | Stop reason: SDUPTHER

## 2017-11-27 NOTE — MR AVS SNAPSHOT
Visit Information Date & Time Provider Department Dept. Phone Encounter #  
 11/27/2017  8:30 AM Dominique Cox MD [address] [facility] [phone] [id number] Follow-up Instructions Return in about 6 months (around 5/27/2018). Upcoming Health Maintenance Date Due Pneumococcal 19-64 Medium Risk (1 of 1 - PPSV23) 2/15/1980 DTaP/Tdap/Td series (1 - Tdap) 2/15/1982 EYE EXAM RETINAL OR DILATED Q1 5/28/2016 PAP AKA CERVICAL CYTOLOGY 6/1/2016 BREAST CANCER SCRN MAMMOGRAM 1/6/2017 FOOT EXAM Q1 11/28/2017 MICROALBUMIN Q1 11/28/2017 HEMOGLOBIN A1C Q6M 11/30/2017 LIPID PANEL Q1 5/30/2018 COLONOSCOPY 12/1/2024 Allergies as of 11/27/2017  Review Complete On: 11/27/2017 By: Dominique Cox MD  
  
 Severity Noted Reaction Type Reactions Amoxicillin High 06/14/2010    Anaphylaxis Empirin  06/14/2010    Other (comments) Numbness and tingling in extremities Lyrica [Pregabalin]  06/14/2010    Other (comments) Caused muenstral cycle to be sporadic Mepergan  06/14/2010    Other (comments) Numbness and tingling of extremities Meperidine  06/14/2010    Other (comments) Numbness and tingling of extremities Promethazine  06/14/2010    Not Reported This Time  
 Numbness and tingling of extremities Deveron Pin [Milnacipran]  03/31/2011    Palpitations Current Immunizations  Reviewed on 5/25/2016 No immunizations on file. Not reviewed this visit You Were Diagnosed With   
  
 Codes Comments Essential hypertension    -  Primary ICD-10-CM: I10 
ICD-9-CM: 401.9 Pre-diabetes     ICD-10-CM: R73.03 
ICD-9-CM: 790.29 Vitamin D deficiency     ICD-10-CM: E55.9 ICD-9-CM: 268.9 Fibromyalgia     ICD-10-CM: M79.7 ICD-9-CM: 729.1 Chronic bilateral low back pain without sciatica     ICD-10-CM: M54.5, G89.29 ICD-9-CM: 724.2, 338.29 Vitals BP Pulse Temp Resp Height(growth percentile) Weight(growth percentile) 131/81 (BP 1 Location: Left arm, BP Patient Position: Sitting) 84 98.5 °F (36.9 °C) (Oral) 20 5' 1\" (1.549 m) 158 lb 9 oz (71.9 kg) LMP SpO2 BMI OB Status Smoking Status 05/07/2015 97% 29.96 kg/m2 Hysterectomy Never Smoker Vitals History BMI and BSA Data Body Mass Index Body Surface Area  
 29.96 kg/m 2 1.76 m 2 Preferred Pharmacy Pharmacy Name Phone [id number], [address] [facility] [phone] Your Updated Medication List  
  
   
This list is accurate as of: 11/27/17  8:54 AM.  Always use your most recent med list.  
  
  
  
  
 cetirizine 10 mg tablet Commonly known as:  ZYRTEC  
TAKE 1 TABLET BY MOUTH DAILY. clindinium-chlordiazePOXIDE 5-2.5 mg per capsule Commonly known as:  Kimberly  Take 1 Cap by mouth two (2) times a day. Max Daily Amount: 2 Caps. clobetasol 0.05 % external solution Commonly known as:  TEMOVATE  
APPLY TO AFFECTED AREA TWICE A DAY FOR 30 DAYS DISCARD REMAINDER  
  
 desonide 0.05 % topical ointment Commonly known as:  Marvetta Hall Apply  to affected area two (2) times a day. escitalopram oxalate 10 mg tablet Commonly known as:  Janeann Ramp Take 1 Tab by mouth daily. metaxalone 800 mg tablet Commonly known as:  SKELAXIN Take 1 Tab by mouth three (3) times daily. pantoprazole 40 mg tablet Commonly known as:  PROTONIX  
TAKE 1 TAB BY MOUTH DAILY. salicylic acid 6 % Sham  
APPLY EXTERNALLY DAILY. TACLONEX 0.005-0.064 % Susp Generic drug:  Betamethasone-Calcipotriene  
  
 tamoxifen 20 mg tablet Commonly known as:  NOLVADEX TAKE 1 TABLET DAILY  
  
 triamcinolone acetonide 0.1 % ointment Commonly known as:  KENALOG Apply  to affected area two (2) times a day. use thin layer  
  
 turmeric-turmeric root extract 450-50 mg Cap Take  by mouth daily. valsartan-hydroCHLOROthiazide 160-12.5 mg per tablet Commonly known as:  DIOVAN-HCT  
TAKE 1 TABLET DAILY VITAMIN D3 2,000 unit Tab Generic drug:  cholecalciferol (vitamin D3) Take  by mouth daily. Prescriptions Sent to Pharmacy Refills  
 metaxalone (SKELAXIN) 800 mg tablet 11 Sig: Take 1 Tab by mouth three (3) times daily. Class: Normal  
 [id number] [place], [address] [facility] #: [phone] Route: Oral  
  
We Performed the Following HEMOGLOBIN A1C WITH EAG [41706 CPT(R)] LIPID PANEL [76606 CPT(R)] METABOLIC PANEL, COMPREHENSIVE [75537 CPT(R)] VITAMIN D, 25 HYDROXY [id number] CPT(R)] Follow-up Instructions Return in about 6 months (around 5/27/2018). Introducing [facility]! Dear Fred Zambrano: Thank you for requesting a Red Aril account. Our records indicate that you already have an active Red Aril account. You can access your account anytime at [email or web address] Did you know that you can access your hospital and ER discharge instructions at any time in Red Aril? You can also review all of your test results from your hospital stay or ER visit. Additional Information If you have questions, please visit the Frequently Asked Questions section of the Red Aril website at [email or web address] Remember, Red Aril is NOT to be used for urgent needs. For medical emergencies, dial 911. Now available from your iPhone and Android! Please provide this summary of care documentation to your next provider. Your primary care clinician is listed as RODRIGO FERNANDEZ. If you have any questions after today's visit, please call [phone].

## 2017-11-27 NOTE — TELEPHONE ENCOUNTER
Patient called and left message starting she is having a funny sensation in right breast. I called her back. Last surgery was in 2015 for DCIS. Sensation started this weekend and started in her shoulder down into her right  breast. She has not done a self exam. She is wondering if this is ortho related. She is seeing an orthopedic this week for her right hip. She will call back after that appointment to come in and be seen by Dr. Toño Rendon or Shaylee Leija NP to further assess. She is comfortable with this plan.

## 2017-11-27 NOTE — PROGRESS NOTES
1. Have you been to the ER, urgent care clinic since your last visit? Hospitalized since your last visit? No    2. Have you seen or consulted any other health care providers outside of the [address] [place] [address] since your last visit? Include any pap smears or colon screening. No   Chief Complaint   Patient presents with    Hypertension     6 mos fuv hypertension       Due for labs for dm    Refill lbp rx      Chief Complaint   Patient presents with    Hypertension     6 mos fuv hypertension     She is a 65 y.o. female who presents for evalution. Reviewed PmHx, RxHx, FmHx, SocHx, AllgHx and updated and dated in the chart. Patient Active Problem List    Diagnosis    Psoriasis    STEPHANIE (obstructive sleep apnea)    Esophageal stricture    DCIS (ductal carcinoma in situ)     2/2015 RIGHT lumpectomy for 6 mm DCIS grade 2, %. %  Tamoxifen      Pre-diabetes    Vitamin D deficiency    GERD (gastroesophageal reflux disease)    Depressive disorder, not elsewhere classified    Insomnia    Fibromyalgia    HTN (hypertension)    MVP (mitral valve prolapse)    IBS (irritable bowel syndrome)       Review of Systems - negative except as listed above in the HPI    Objective:     Vitals:    11/27/17 0829   BP: 131/81   Pulse: 84   Resp: 20   Temp: 98.5 °F (36.9 °C)   TempSrc: Oral   SpO2: 97%   Weight: 158 lb 9 oz (71.9 kg)   Height: 5' 1\" (1.549 m)     Physical Examination: General appearance - alert, well appearing, and in no distress  Chest - clear to auscultation, no wheezes, rales or rhonchi, symmetric air entry  Heart - normal rate, regular rhythm, normal S1, S2, no murmurs, rubs, clicks or gallops  Abdomen - soft, nontender, nondistended, no masses or organomegaly  Extremities - peripheral pulses normal, no pedal edema, no clubbing or cyanosis    Assessment/ Plan:   Diagnoses and all orders for this visit:    1.  Essential hypertension  -     LIPID PANEL  -     METABOLIC PANEL, COMPREHENSIVE  -at goal    2. Pre-diabetes  -     METABOLIC PANEL, COMPREHENSIVE  -     HEMOGLOBIN A1C WITH EAG  -  Lab Results   Component Value Date/Time    Hemoglobin A1c 5.7 05/30/2017 09:04 AM     -at goal    3. Vitamin D deficiency  -     VITAMIN D, 25 HYDROXY    4. Fibromyalgia  -     metaxalone (SKELAXIN) 800 mg tablet; Take 1 Tab by mouth three (3) times daily. -stable on rx    5. Chronic bilateral low back pain without sciatica  -     metaxalone (SKELAXIN) 800 mg tablet; Take 1 Tab by mouth three (3) times daily.  -refer to ortho since inc pain       Follow-up Disposition:  Return in about 6 months (around 5/27/2018). I have discussed the diagnosis with the patient and the intended plan as seen in the above orders. The patient understands and agrees with the plan. The patient has received an after-visit summary and questions were answered concerning future plans. Medication Side Effects and Warnings were discussed with patient  Patient Labs were reviewed and or requested:  Patient Past Records were reviewed and or requested    Christ Feliciano M.D. There are no Patient Instructions on file for this visit.

## 2017-11-28 LAB
25(OH)D3+25(OH)D2 SERPL-MCNC: 71 NG/ML (ref 30–100)
ALBUMIN SERPL-MCNC: 4.4 G/DL (ref 3.5–5.5)
ALBUMIN/GLOB SERPL: 1.7 {RATIO} (ref 1.2–2.2)
ALP SERPL-CCNC: 47 IU/L (ref 39–117)
ALT SERPL-CCNC: 15 IU/L (ref 0–32)
AST SERPL-CCNC: 18 IU/L (ref 0–40)
BILIRUB SERPL-MCNC: 0.2 MG/DL (ref 0–1.2)
BUN SERPL-MCNC: 18 MG/DL (ref 6–24)
BUN/CREAT SERPL: 26 (ref 9–23)
CALCIUM SERPL-MCNC: 9.1 MG/DL (ref 8.7–10.2)
CHLORIDE SERPL-SCNC: 101 MMOL/L (ref 96–106)
CHOLEST SERPL-MCNC: 180 MG/DL (ref 100–199)
CO2 SERPL-SCNC: 28 MMOL/L (ref 18–29)
CREAT SERPL-MCNC: 0.69 MG/DL (ref 0.57–1)
EST. AVERAGE GLUCOSE BLD GHB EST-MCNC: 117 MG/DL
GFR SERPLBLD CREATININE-BSD FMLA CKD-EPI: 113 ML/MIN/1.73
GFR SERPLBLD CREATININE-BSD FMLA CKD-EPI: 98 ML/MIN/1.73
GLOBULIN SER CALC-MCNC: 2.6 G/DL (ref 1.5–4.5)
GLUCOSE SERPL-MCNC: 95 MG/DL (ref 65–99)
HBA1C MFR BLD: 5.7 % (ref 4.8–5.6)
HDLC SERPL-MCNC: 55 MG/DL
INTERPRETATION, 910389: NORMAL
LDLC SERPL CALC-MCNC: 88 MG/DL (ref 0–99)
POTASSIUM SERPL-SCNC: 4.1 MMOL/L (ref 3.5–5.2)
PROT SERPL-MCNC: 7 G/DL (ref 6–8.5)
SODIUM SERPL-SCNC: 144 MMOL/L (ref 134–144)
TRIGL SERPL-MCNC: 183 MG/DL (ref 0–149)
VLDLC SERPL CALC-MCNC: 37 MG/DL (ref 5–40)

## 2017-12-11 ENCOUNTER — DOCUMENTATION ONLY (OUTPATIENT)
Dept: FAMILY MEDICINE CLINIC | Age: 57
End: 2017-12-11

## 2017-12-11 NOTE — PROGRESS NOTES
PA approved for Metaxalone from 12/5/17 thru 12/5/18 PA# [id number], copy faxed to pharmacy and placed in scan folder to be scanned to chart

## 2018-01-09 ENCOUNTER — TELEPHONE (OUTPATIENT)
Dept: ONCOLOGY | Age: 58
End: 2018-01-09

## 2018-01-09 ENCOUNTER — DOCUMENTATION ONLY (OUTPATIENT)
Dept: FAMILY MEDICINE CLINIC | Age: 58
End: 2018-01-09

## 2018-01-09 NOTE — TELEPHONE ENCOUNTER
Patient called and stated that she had spoke to her PCP and needed to let Darek Cintron know.  # [phone]

## 2018-01-09 NOTE — PROGRESS NOTES
Shayan Rojas cancer registry status was put on [facility] desk to process

## 2018-03-26 ENCOUNTER — OFFICE VISIT (OUTPATIENT)
Dept: FAMILY MEDICINE CLINIC | Age: 58
End: 2018-03-26

## 2018-03-26 VITALS
DIASTOLIC BLOOD PRESSURE: 73 MMHG | TEMPERATURE: 98.4 F | RESPIRATION RATE: 20 BRPM | OXYGEN SATURATION: 97 % | BODY MASS INDEX: 27.56 KG/M2 | HEIGHT: 61 IN | SYSTOLIC BLOOD PRESSURE: 118 MMHG | HEART RATE: 79 BPM | WEIGHT: 146 LBS

## 2018-03-26 DIAGNOSIS — R42 VERTIGO: Primary | ICD-10-CM

## 2018-03-26 NOTE — MR AVS SNAPSHOT
[address] [address] 
[phone] Patient: Heather Dye MRN: [id number] YUP:[birth year] Visit Information Date & Time Provider Department Dept. Phone Encounter #  
 3/26/2018  2:45 PM Flores Orellana MD [address] [facility] [phone] [id number] Follow-up Instructions Return if symptoms worsen or fail to improve. Your Appointments 5/29/2018  8:30 AM  
ESTABLISHED PATIENT with Flores Orellana MD  
[address] [facility]) Appt Note: 6mo fuv  
 [address] [address] [address]  
[phone]  
  
   
 [address] [address] Upcoming Health Maintenance Date Due Pneumococcal 19-64 Medium Risk (1 of 1 - PPSV23) 2/15/1980 DTaP/Tdap/Td series (1 - Tdap) 2/15/1982 EYE EXAM RETINAL OR DILATED Q1 5/28/2016 PAP AKA CERVICAL CYTOLOGY 6/1/2016 BREAST CANCER SCRN MAMMOGRAM 1/6/2017 FOOT EXAM Q1 11/28/2017 MICROALBUMIN Q1 11/28/2017 HEMOGLOBIN A1C Q6M 5/27/2018 LIPID PANEL Q1 11/27/2018 COLONOSCOPY 12/1/2024 Allergies as of 3/26/2018  Review Complete On: 3/26/2018 By: Flores Orellana MD  
  
 Severity Noted Reaction Type Reactions Amoxicillin High 06/14/2010    Anaphylaxis Empirin  06/14/2010    Other (comments) Numbness and tingling in extremities Lyrica [Pregabalin]  06/14/2010    Other (comments) Caused muenstral cycle to be sporadic Mepergan  06/14/2010    Other (comments) Numbness and tingling of extremities Meperidine  06/14/2010    Other (comments) Numbness and tingling of extremities Promethazine  06/14/2010    Not Reported This Time  
 Numbness and tingling of extremities Espinoza Primmer [Milnacipran]  03/31/2011    Palpitations Current Immunizations  Reviewed on 5/25/2016 No immunizations on file. Not reviewed this visit You Were Diagnosed With   
  
 Codes Comments Vertigo    -  Primary ICD-10-CM: I99 ICD-9-CM: 780.4 Vitals BP Pulse Temp Resp Height(growth percentile) Weight(growth percentile) 118/73 79 98.4 °F (36.9 °C) 20 5' 1\" (1.549 m) 146 lb (66.2 kg) LMP SpO2 BMI OB Status Smoking Status 05/07/2015 97% 27.59 kg/m2 Hysterectomy Never Smoker Vitals History BMI and BSA Data Body Mass Index Body Surface Area  
 27.59 kg/m 2 1.69 m 2 Preferred Pharmacy Pharmacy Name Phone [id number], [address] [facility] [phone] Your Updated Medication List  
  
   
This list is accurate as of 3/26/18  3:36 PM.  Always use your most recent med list.  
  
  
  
  
 cetirizine 10 mg tablet Commonly known as:  ZYRTEC  
TAKE 1 TABLET BY MOUTH DAILY. clindinium-chlordiazePOXIDE 5-2.5 mg per capsule Commonly known as:  Jaxson Rosado Take 1 Cap by mouth two (2) times a day. Max Daily Amount: 2 Caps. clobetasol 0.05 % external solution Commonly known as:  TEMOVATE  
APPLY TO AFFECTED AREA TWICE A DAY FOR 30 DAYS DISCARD REMAINDER  
  
 desonide 0.05 % topical ointment Commonly known as:  Vidhi Raleigh Apply  to affected area two (2) times a day. escitalopram oxalate 10 mg tablet Commonly known as:  Kenisha Mend Take 1 Tab by mouth daily. metaxalone 800 mg tablet Commonly known as:  SKELAXIN Take 1 Tab by mouth three (3) times daily. pantoprazole 40 mg tablet Commonly known as:  PROTONIX  
TAKE 1 TAB BY MOUTH DAILY. salicylic acid 6 % Sham  
APPLY EXTERNALLY DAILY. TACLONEX 0.005-0.064 % Susp Generic drug:  Betamethasone-Calcipotriene  
  
 tamoxifen 20 mg tablet Commonly known as:  NOLVADEX TAKE 1 TABLET DAILY  
  
 triamcinolone acetonide 0.1 % ointment Commonly known as:  KENALOG Apply  to affected area two (2) times a day. use thin layer  
  
 valsartan-hydroCHLOROthiazide 160-12.5 mg per tablet Commonly known as:  DIOVAN-HCT  
TAKE 1 TABLET DAILY VITAMIN D3 2,000 unit Tab Generic drug:  cholecalciferol (vitamin D3) Take  by mouth daily. Follow-up Instructions Return if symptoms worsen or fail to improve. [facility]! Dear Osvaldo Babinski: Thank you for requesting a [facility] account. Our records indicate that you already have an active [facility] account. You can access your account anytime at [email or web address] Did you know that you can access your hospital and ER discharge instructions at any time in [facility]? You can also review all of your test results from your hospital stay or ER visit. Additional Information If you have questions, please visit the Frequently Asked Questions section of the [facility] website at [email or web address] Remember, [facility] is NOT to be used for urgent needs. For medical emergencies, dial 911. Now available from your iPhone and Android! Please provide this summary of care documentation to your next provider. Your primary care clinician is listed as RODRIGO FERNANDEZ. If you have any questions after today's visit, please call [phone].

## 2018-03-26 NOTE — PROGRESS NOTES
Patient here for dizziness. She states she has not had any strength. At beginning of feb, she started  a new diet, losing 20 lbs. She states the dizziness started about 2 weeks ago. Everyday. 1. Have you been to the ER, urgent care clinic since your last visit? Hospitalized since your last visit? No    2. Have you seen or consulted any other health care providers outside of the [address] [place] [address] since your last visit? Include any pap smears or colon screening. No       Chief Complaint   Patient presents with    Dizziness     She is a 63 y.o. female who presents for evalution. Reviewed PmHx, RxHx, FmHx, SocHx, AllgHx and updated and dated in the chart. Patient Active Problem List    Diagnosis    Psoriasis    STEPHANIE (obstructive sleep apnea)    Esophageal stricture    DCIS (ductal carcinoma in situ)     2/2015 RIGHT lumpectomy for 6 mm DCIS grade 2, %. %  Tamoxifen      Pre-diabetes    Vitamin D deficiency    GERD (gastroesophageal reflux disease)    Depressive disorder, not elsewhere classified    Insomnia    Fibromyalgia    HTN (hypertension)    MVP (mitral valve prolapse)    IBS (irritable bowel syndrome)       Review of Systems - negative except as listed above in the HPI    Objective:     Vitals:    03/26/18 1522   BP: 118/73   Pulse: 79   Resp: 20   Temp: 98.4 °F (36.9 °C)   SpO2: 97%   Weight: 146 lb (66.2 kg)   Height: 5' 1\" (1.549 m)     Physical Examination: General appearance - alert, well appearing, and in no distress  Chest - clear to auscultation, no wheezes, rales or rhonchi, symmetric air entry  Heart - normal rate, regular rhythm, normal S1, S2, no murmurs, rubs, clicks or gallops      Assessment/ Plan:   Diagnoses and all orders for this visit:    1. Vertigo  -due to low bp and wt loss  -cut pill in half for bp       Follow-up Disposition:  Return if symptoms worsen or fail to improve.     I have discussed the diagnosis with the patient and the intended plan as seen in the above orders. The patient understands and agrees with the plan. The patient has received an after-visit summary and questions were answered concerning future plans. Medication Side Effects and Warnings were discussed with patient  Patient Labs were reviewed and or requested:  Patient Past Records were reviewed and or requested    Shari Hinkle M.D. There are no Patient Instructions on file for this visit.

## 2018-04-12 ENCOUNTER — OFFICE VISIT (OUTPATIENT)
Dept: FAMILY MEDICINE CLINIC | Age: 58
End: 2018-04-12

## 2018-04-12 VITALS
RESPIRATION RATE: 16 BRPM | SYSTOLIC BLOOD PRESSURE: 123 MMHG | DIASTOLIC BLOOD PRESSURE: 65 MMHG | WEIGHT: 142.2 LBS | OXYGEN SATURATION: 98 % | TEMPERATURE: 98.7 F | BODY MASS INDEX: 26.85 KG/M2 | HEIGHT: 61 IN | HEART RATE: 72 BPM

## 2018-04-12 DIAGNOSIS — L03.312 CELLULITIS OF BACK EXCEPT BUTTOCK: Primary | ICD-10-CM

## 2018-04-12 RX ORDER — DOXYCYCLINE 100 MG/1
100 TABLET ORAL 2 TIMES DAILY
Qty: 20 TAB | Refills: 0 | Status: SHIPPED | OUTPATIENT
Start: 2018-04-12 | End: 2019-10-09

## 2018-04-12 RX ORDER — DICLOFENAC SODIUM 75 MG/1
75 TABLET, DELAYED RELEASE ORAL
Qty: 30 TAB | Refills: 1 | Status: SHIPPED | OUTPATIENT
Start: 2018-04-12 | End: 2021-02-04 | Stop reason: ALTCHOICE

## 2018-04-12 RX ORDER — MUPIROCIN 20 MG/G
OINTMENT TOPICAL 2 TIMES DAILY
Qty: 22 G | Refills: 0 | Status: SHIPPED | OUTPATIENT
Start: 2018-04-12

## 2018-04-12 NOTE — PROGRESS NOTES
Lucy Richmond is a 63 y.o. female   Chief Complaint   Patient presents with    Insect Bite     Possible spider bite    Pt noticed a welt on her back a few days ago and has been getting worse and is having joint pains as well. No fever no chills. Pain is a 8-9/10. Took motrin with a little relief. This lesion did pop and drain. she is a 63y.o. year old female who presents for evalution. Reviewed PmHx, RxHx, FmHx, SocHx, AllgHx and updated and dated in the chart. Review of Systems - negative except as listed above in the HPI    Objective:     Vitals:    04/12/18 1442   BP: 123/65   Pulse: 72   Resp: 16   Temp: 98.7 °F (37.1 °C)   TempSrc: Oral   SpO2: 98%   Weight: 142 lb 3.2 oz (64.5 kg)   Height: 5' 1\" (1.549 m)       Current Outpatient Prescriptions   Medication Sig    doxycycline (ADOXA) 100 mg tablet Take 1 Tab by mouth two (2) times a day.  mupirocin (BACTROBAN) 2 % ointment Apply  to affected area two (2) times a day.  diclofenac EC (VOLTAREN) 75 mg EC tablet Take 1 Tab by mouth two (2) times daily as needed.  metaxalone (SKELAXIN) 800 mg tablet Take 1 Tab by mouth three (3) times daily. (Patient taking differently: Take 800 mg by mouth three (3) times daily as needed.)    pantoprazole (PROTONIX) 40 mg tablet TAKE 1 TAB BY MOUTH DAILY.  escitalopram oxalate (LEXAPRO) 10 mg tablet Take 1 Tab by mouth daily.  tamoxifen (NOLVADEX) 20 mg tablet TAKE 1 TABLET DAILY    valsartan-hydroCHLOROthiazide (DIOVAN-HCT) 160-12.5 mg per tablet TAKE 1 TABLET DAILY (Patient taking differently: TAKE 1/2 TABLET DAILY)    clobetasol (TEMOVATE) 0.05 % external solution APPLY TO AFFECTED AREA TWICE A DAY FOR 30 DAYS DISCARD REMAINDER    desonide (DESOWEN) 0.05 % topical ointment Apply  to affected area two (2) times a day.  clindinium-chlordiazePOXIDE (LIBRAX) 5-2.5 mg per capsule Take 1 Cap by mouth two (2) times a day. Max Daily Amount: 2 Caps.     triamcinolone acetonide (KENALOG) 0.1 % ointment Apply  to affected area two (2) times a day. use thin layer    cholecalciferol, vitamin D3, (VITAMIN D3) 2,000 unit tab Take  by mouth daily.  salicylic acid 6 % sham APPLY EXTERNALLY DAILY.  cetirizine (ZYRTEC) 10 mg tablet TAKE 1 TABLET BY MOUTH DAILY.  TACLONEX 0.005-0.064 % susp      No current facility-administered medications for this visit. Physical Examination: Skin - R thoracic back at level of pt bra strap is an area of erythema and induration without fluctuance, warm and ttp    Assessment/ Plan:   Diagnoses and all orders for this visit:    1. Cellulitis of back except buttock  -     doxycycline (ADOXA) 100 mg tablet; Take 1 Tab by mouth two (2) times a day. -     mupirocin (BACTROBAN) 2 % ointment; Apply  to affected area two (2) times a day. -     diclofenac EC (VOLTAREN) 75 mg EC tablet; Take 1 Tab by mouth two (2) times daily as needed. Follow-up Disposition:  Return if symptoms worsen or fail to improve. I have discussed the diagnosis with the patient and the intended plan as seen in the above orders. The patient has received an after-visit summary and questions were answered concerning future plans. Pt conveyed understanding of plan.     Medication Side Effects and Warnings were discussed with patient      Helder Cobb DO

## 2018-04-12 NOTE — PATIENT INSTRUCTIONS
Cellulitis: Care Instructions  Your Care Instructions    Cellulitis is a skin infection. It often occurs after a break in the skin from a scrape, cut, bite, or puncture, or after a rash. The doctor has checked you carefully, but problems can develop later. If you notice any problems or new symptoms, get medical treatment right away. Follow-up care is a key part of your treatment and safety. Be sure to make and go to all appointments, and call your doctor if you are having problems. It's also a good idea to know your test results and keep a list of the medicines you take. How can you care for yourself at home? · Take your antibiotics as directed. Do not stop taking them just because you feel better. You need to take the full course of antibiotics. · Prop up the infected area on pillows to reduce pain and swelling. Try to keep the area above the level of your heart as often as you can. · If your doctor told you how to care for your wound, follow your doctor's instructions. If you did not get instructions, follow this general advice:  ¨ Wash the wound with clean water 2 times a day. Don't use hydrogen peroxide or alcohol, which can slow healing. ¨ You may cover the wound with a thin layer of petroleum jelly, such as Vaseline, and a nonstick bandage. ¨ Apply more petroleum jelly and replace the bandage as needed. · Be safe with medicines. Take pain medicines exactly as directed. ¨ If the doctor gave you a prescription medicine for pain, take it as prescribed. ¨ If you are not taking a prescription pain medicine, ask your doctor if you can take an over-the-counter medicine. To prevent cellulitis in the future  · Try to prevent cuts, scrapes, or other injuries to your skin. Cellulitis most often occurs where there is a break in the skin. · If you get a scrape, cut, mild burn, or bite, wash the wound with clean water as soon as you can to help avoid infection.  Don't use hydrogen peroxide or alcohol, which can slow healing. · If you have swelling in your legs (edema), support stockings and good skin care may help prevent leg sores and cellulitis. · Take care of your feet, especially if you have diabetes or other conditions that increase the risk of infection. Wear shoes and socks. Do not go barefoot. If you have athlete's foot or other skin problems on your feet, talk to your doctor about how to treat them. When should you call for help? Call your doctor now or seek immediate medical care if:  ? · You have signs that your infection is getting worse, such as:  ¨ Increased pain, swelling, warmth, or redness. ¨ Red streaks leading from the area. ¨ Pus draining from the area. ¨ A fever. ? · You get a rash. ? Watch closely for changes in your health, and be sure to contact your doctor if:  ? · You are not getting better after 1 day (24 hours). ? · You do not get better as expected. Where can you learn more? Go to http://anabelle-wanda.info/. Minh Ortega in the search box to learn more about \"Cellulitis: Care Instructions. \"  Current as of: October 13, 2016  Content Version: 11.4  © 1350-6785 [employer]. Care instructions adapted under license by [facility] (which disclaims liability or warranty for this information). If you have questions about a medical condition or this instruction, always ask your healthcare professional. [place] [address] any warranty or liability for your use of this information.

## 2018-04-12 NOTE — PROGRESS NOTES
Chief Complaint   Patient presents with    Insect Bite     Possible spider bite     1. Have you been to the ER, urgent care clinic since your last visit? Hospitalized since your last visit? No    2. Have you seen or consulted any other health care providers outside of the [facility] since your last visit? Include any pap smears or colon screening.  No

## 2018-04-12 NOTE — MR AVS SNAPSHOT
[address] [address] 
[phone] Patient: Aileen Etienne MRN: [id number] TOW:[id number] Visit Information Date & Time Provider Department Dept. Phone Encounter #  
 4/12/2018  2:45 PM Tg Ha, Mandy Razo [phone] [id number] Your Appointments 5/29/2018  8:30 AM  
ESTABLISHED PATIENT with Abby Obando MD  
[address] [facility] Appt Note: 6mo fuv  
 [address] [facility] [address] [id number] [phone]  
  
   
 N [address] [place] [address] Road [address] Upcoming Health Maintenance Date Due Pneumococcal 19-64 Highest Risk (1 of 3 - PCV13) 2/15/1980 DTaP/Tdap/Td series (1 - Tdap) 2/15/1982 EYE EXAM RETINAL OR DILATED Q1 5/28/2016 PAP AKA CERVICAL CYTOLOGY 6/1/2016 BREAST CANCER SCRN MAMMOGRAM 1/6/2017 FOOT EXAM Q1 11/28/2017 MICROALBUMIN Q1 11/28/2017 HEMOGLOBIN A1C Q6M 5/27/2018 LIPID PANEL Q1 11/27/2018 COLONOSCOPY 12/1/2024 Allergies as of 4/12/2018  Review Complete On: 4/12/2018 By: Tg Ha DO Severity Noted Reaction Type Reactions Amoxicillin High 06/14/2010    Anaphylaxis Empirin  06/14/2010    Other (comments) Numbness and tingling in extremities Lyrica [Pregabalin]  06/14/2010    Other (comments) Caused muenstral cycle to be sporadic Mepergan  06/14/2010    Other (comments) Numbness and tingling of extremities Meperidine  06/14/2010    Other (comments) Numbness and tingling of extremities Promethazine  06/14/2010    Not Reported This Time  
 Numbness and tingling of extremities Johanna [place] [Milnacipran]  03/31/2011    Palpitations Current Immunizations  Reviewed on 5/25/2016 No immunizations on file. Not reviewed this visit You Were Diagnosed With   
  
 Codes Comments Cellulitis of back except buttock    -  Primary ICD-10-CM: A58.329 ICD-9-CM: 682.2 Vitals BP Pulse Temp Resp Height(growth percentile) Weight(growth percentile) 123/65 (BP 1 Location: Left arm, BP Patient Position: Sitting) 72 98.7 °F (37.1 °C) (Oral) 16 5' 1\" (1.549 m) 142 lb 3.2 oz (64.5 kg) LMP SpO2 BMI OB Status Smoking Status 05/07/2015 98% 26.87 kg/m2 Hysterectomy Never Smoker Vitals History BMI and BSA Data Body Mass Index Body Surface Area  
 26.87 kg/m 2 1.67 m 2 Preferred Pharmacy Pharmacy Name Phone [facility], [address] [facility] [phone] Your Updated Medication List  
  
   
This list is accurate as of 4/12/18  2:59 PM.  Always use your most recent med list.  
  
  
  
  
 cetirizine 10 mg tablet Commonly known as:  ZYRTEC  
TAKE 1 TABLET BY MOUTH DAILY. clindinium-chlordiazePOXIDE 5-2.5 mg per capsule Commonly known as:  Ted Edman Take 1 Cap by mouth two (2) times a day. Max Daily Amount: 2 Caps. clobetasol 0.05 % external solution Commonly known as:  TEMOVATE  
APPLY TO AFFECTED AREA TWICE A DAY FOR 30 DAYS DISCARD REMAINDER  
  
 desonide 0.05 % topical ointment Commonly known as:  Albina Pass Apply  to affected area two (2) times a day. diclofenac EC 75 mg EC tablet Commonly known as:  VOLTAREN Take 1 Tab by mouth two (2) times daily as needed. doxycycline 100 mg tablet Commonly known as:  ADOXA Take 1 Tab by mouth two (2) times a day. escitalopram oxalate 10 mg tablet Commonly known as:  Ilene Polio Take 1 Tab by mouth daily. metaxalone 800 mg tablet Commonly known as:  SKELAXIN Take 1 Tab by mouth three (3) times daily. mupirocin 2 % ointment Commonly known as:  [employer] Apply  to affected area two (2) times a day. pantoprazole 40 mg tablet Commonly known as:  PROTONIX  
TAKE 1 TAB BY MOUTH DAILY. salicylic acid 6 % Sham  
APPLY EXTERNALLY DAILY. TACLONEX 0.005-0.064 % Susp Generic drug:  Betamethasone-Calcipotriene tamoxifen 20 mg tablet Commonly known as:  NOLVADEX TAKE 1 TABLET DAILY  
  
 triamcinolone acetonide 0.1 % ointment Commonly known as:  KENALOG Apply  to affected area two (2) times a day. use thin layer  
  
 valsartan-hydroCHLOROthiazide 160-12.5 mg per tablet Commonly known as:  DIOVAN-HCT  
TAKE 1 TABLET DAILY  
  
 VITAMIN D3 2,000 unit Tab Generic drug:  cholecalciferol (vitamin D3) Take  by mouth daily. Prescriptions Sent to Pharmacy Refills  
 doxycycline (ADOXA) 100 mg tablet 0 Sig: Take 1 Tab by mouth two (2) times a day. Class: Normal  
 Pharmacy: [facility][id number] - [place], [facility] Ph #: [phone] Route: Oral  
 mupirocin (BACTROBAN) 2 % ointment 0 Sig: Apply  to affected area two (2) times a day. Class: Normal  
 Pharmacy: [facility]/pharmacy #[id number] - [place], [facility] Ph #: [phone] Route: Topical  
 diclofenac EC (VOLTAREN) 75 mg EC tablet 1 Sig: Take 1 Tab by mouth two (2) times daily as needed. Class: Normal  
 Pharmacy: [facility]/pharmacy #[id number] - [place], [facility] Ph #: [phone] Route: Oral  
  
Patient Instructions Cellulitis: Care Instructions Your Care Instructions Cellulitis is a skin infection. It often occurs after a break in the skin from a scrape, cut, bite, or puncture, or after a rash. The doctor has checked you carefully, but problems can develop later. If you notice any problems or new symptoms, get medical treatment right away. Follow-up care is a key part of your treatment and safety. Be sure to make and go to all appointments, and call your doctor if you are having problems. It's also a good idea to know your test results and keep a list of the medicines you take. How can you care for yourself at home? · Take your antibiotics as directed. Do not stop taking them just because you feel better. You need to take the full course of antibiotics. · Prop up the infected area on pillows to reduce pain and swelling. Try to keep the area above the level of your heart as often as you can. · If your doctor told you how to care for your wound, follow your doctor's instructions. If you did not get instructions, follow this general advice: ¨ Wash the wound with clean water 2 times a day. Don't use hydrogen peroxide or alcohol, which can slow healing. ¨ You may cover the wound with a thin layer of petroleum jelly, such as Vaseline, and a nonstick bandage. ¨ Apply more petroleum jelly and replace the bandage as needed. · Be safe with medicines. Take pain medicines exactly as directed. ¨ If the doctor gave you a prescription medicine for pain, take it as prescribed. ¨ If you are not taking a prescription pain medicine, ask your doctor if you can take an over-the-counter medicine. To prevent cellulitis in the future · Try to prevent cuts, scrapes, or other injuries to your skin. Cellulitis most often occurs where there is a break in the skin. · If you get a scrape, cut, mild burn, or bite, wash the wound with clean water as soon as you can to help avoid infection. Don't use hydrogen peroxide or alcohol, which can slow healing. · If you have swelling in your legs (edema), support stockings and good skin care may help prevent leg sores and cellulitis. · Take care of your feet, especially if you have diabetes or other conditions that increase the risk of infection. Wear shoes and socks. Do not go barefoot. If you have athlete's foot or other skin problems on your feet, talk to your doctor about how to treat them. When should you call for help? Call your doctor now or seek immediate medical care if: 
? · You have signs that your infection is getting worse, such as: 
¨ Increased pain, swelling, warmth, or redness. ¨ Red streaks leading from the area. ¨ Pus draining from the area. ¨ A fever. ? · You get a rash. ?Watch closely for changes in your health, and be sure to contact your doctor if: 
? · You are not getting better after 1 day (24 hours). ? · You do not get better as expected. Where can you learn more? Go to http://anabelle-wanda.info/. Lakisha Carrera in the search box to learn more about \"Cellulitis: Care Instructions. \" Current as of: October 13, 2016 Content Version: 11.4 © 5219-6922 [employer]. Care instructions adapted under license by [employer] (which disclaims liability or warranty for this information). If you have questions about a medical condition or this instruction, always ask your healthcare professional. [address] any warranty or liability for your use of this information. Introducing [facility] & HE[facility] SERVICES! Dear Yuridia Dooley: Thank you for requesting a MyFrontSteps account. Our records indicate that you already have an active MyFrontSteps account. You can access your account anytime at [email or web address] Did you know that you can access your hospital and ER discharge instructions at any time in MyFrontSteps? You can also review all of your test results from your hospital stay or ER visit. Additional Information If you have questions, please visit the Frequently Asked Questions section of the MyFrontSteps website at [email or web address] Remember, MyFrontSteps is NOT to be used for urgent needs. For medical emergencies, dial 911. Now available from your iPhone and Android! Please provide this summary of care documentation to your next provider. Your primary care clinician is listed as RODRIGO FERNANDEZ. If you have any questions after today's visit, please call [phone].

## 2018-05-01 ENCOUNTER — DOCUMENTATION ONLY (OUTPATIENT)
Dept: SLEEP MEDICINE | Age: 58
End: 2018-05-01

## 2018-05-01 NOTE — PROGRESS NOTES
Per DME request, latest download faxed over (fax [phone]) to [place] [facility] location.

## 2018-05-03 ENCOUNTER — DOCUMENTATION ONLY (OUTPATIENT)
Dept: SLEEP MEDICINE | Age: 58
End: 2018-05-03

## 2018-05-04 RX ORDER — TAMOXIFEN CITRATE 20 MG/1
TABLET ORAL
Qty: 90 TAB | Refills: 2 | Status: SHIPPED | OUTPATIENT
Start: 2018-05-04 | End: 2019-01-29 | Stop reason: SDUPTHER

## 2018-05-29 ENCOUNTER — OFFICE VISIT (OUTPATIENT)
Dept: FAMILY MEDICINE CLINIC | Age: 58
End: 2018-05-29

## 2018-05-29 VITALS
WEIGHT: 138.25 LBS | OXYGEN SATURATION: 98 % | DIASTOLIC BLOOD PRESSURE: 77 MMHG | HEIGHT: 61 IN | BODY MASS INDEX: 26.1 KG/M2 | SYSTOLIC BLOOD PRESSURE: 122 MMHG | TEMPERATURE: 98.4 F | HEART RATE: 63 BPM | RESPIRATION RATE: 18 BRPM

## 2018-05-29 DIAGNOSIS — I10 ESSENTIAL HYPERTENSION: Primary | ICD-10-CM

## 2018-05-29 DIAGNOSIS — R73.03 PRE-DIABETES: ICD-10-CM

## 2018-05-29 RX ORDER — VALSARTAN AND HYDROCHLOROTHIAZIDE 80; 12.5 MG/1; MG/1
1 TABLET, FILM COATED ORAL DAILY
Qty: 90 TAB | Refills: 3 | Status: SHIPPED | OUTPATIENT
Start: 2018-05-29 | End: 2019-09-11 | Stop reason: SDUPTHER

## 2018-05-29 NOTE — PROGRESS NOTES
1. Have you been to the ER, urgent care clinic since your last visit? Hospitalized since your last visit? No    2. Have you seen or consulted any other health care providers outside of the [facility] since your last visit? Include any pap smears or colon screening. No     Chief Complaint   Patient presents with    Hypertension     6 mos fuv          Chief Complaint   Patient presents with    Hypertension     6 mos fuv      She is a 63 y.o. female who presents for evalution. Reviewed PmHx, RxHx, FmHx, SocHx, AllgHx and updated and dated in the chart. Patient Active Problem List    Diagnosis    Psoriasis    STEPHANIE (obstructive sleep apnea)    Esophageal stricture    DCIS (ductal carcinoma in situ)     2/2015 RIGHT lumpectomy for 6 mm DCIS grade 2, %. %  Tamoxifen      Pre-diabetes    Vitamin D deficiency    GERD (gastroesophageal reflux disease)    Depressive disorder, not elsewhere classified    Insomnia    Fibromyalgia    HTN (hypertension)    MVP (mitral valve prolapse)    IBS (irritable bowel syndrome)       Review of Systems - negative except as listed above in the HPI    Objective:     Vitals:    05/29/18 0841   BP: 122/77   Pulse: 63   Resp: 18   Temp: 98.4 °F (36.9 °C)   TempSrc: Oral   SpO2: 98%   Weight: 138 lb 4 oz (62.7 kg)   Height: 5' 1\" (1.549 m)     Physical Examination: General appearance - alert, well appearing, and in no distress  Chest - clear to auscultation, no wheezes, rales or rhonchi, symmetric air entry  Heart - normal rate, regular rhythm, normal S1, S2, no murmurs, rubs, clicks or gallops    Assessment/ Plan:   Diagnoses and all orders for this visit:    1. Essential hypertension  -     valsartan-hydroCHLOROthiazide (DIOVAN-HCT) 80-12.5 mg per tablet; Take 1 Tab by mouth daily. Indications: hypertension  -     METABOLIC PANEL, COMPREHENSIVE  -chano dec dose    2.  Pre-diabetes  -     METABOLIC PANEL, COMPREHENSIVE  -     HEMOGLOBIN A1C WITH EAG       Follow-up Disposition:  Return in about 1 year (around 5/29/2019), or if symptoms worsen or fail to improve. I have discussed the diagnosis with the patient and the intended plan as seen in the above orders. The patient understands and agrees with the plan. The patient has received an after-visit summary and questions were answered concerning future plans. Medication Side Effects and Warnings were discussed with patient  Patient Labs were reviewed and or requested:  Patient Past Records were reviewed and or requested    Lissy Hernandez M.D. There are no Patient Instructions on file for this visit.

## 2018-05-29 NOTE — MR AVS SNAPSHOT
[address] [place] [address] 
[phone] Patient: Hoda Ovalle MRN: [id number] OUI:6/53/[id number] Visit Information Date & Time Provider Department Dept. Phone Encounter #  
 5/29/2018  8:30 AM Kenan Burnett MD [address] [facility] [phone] [id number] Follow-up Instructions Return in about 1 year (around 5/29/2019), or if symptoms worsen or fail to improve. Upcoming Health Maintenance Date Due Pneumococcal 19-64 Highest Risk (1 of 3 - PCV13) 2/15/1980 DTaP/Tdap/Td series (1 - Tdap) 2/15/1982 EYE EXAM RETINAL OR DILATED Q1 5/28/2016 PAP AKA CERVICAL CYTOLOGY 6/1/2016 BREAST CANCER SCRN MAMMOGRAM 1/6/2017 FOOT EXAM Q1 11/28/2017 MICROALBUMIN Q1 11/28/2017 HEMOGLOBIN A1C Q6M 5/27/2018 Influenza Age 5 to Adult 8/1/2018 LIPID PANEL Q1 11/27/2018 COLONOSCOPY 12/1/2024 Allergies as of 5/29/2018  Review Complete On: 5/29/2018 By: Kenan Burnett MD  
  
 Severity Noted Reaction Type Reactions Amoxicillin High 06/14/2010    Anaphylaxis Empirin  06/14/2010    Other (comments) Numbness and tingling in extremities Lyrica [Pregabalin]  06/14/2010    Other (comments) Caused muenstral cycle to be sporadic Mepergan  06/14/2010    Other (comments) Numbness and tingling of extremities Meperidine  06/14/2010    Other (comments) Numbness and tingling of extremities Promethazine  06/14/2010    Not Reported This Time  
 Numbness and tingling of extremities Keon Ho [Milnacipran]  03/31/2011    Palpitations Current Immunizations  Reviewed on 5/25/2016 No immunizations on file. Not reviewed this visit You Were Diagnosed With   
  
 Codes Comments Essential hypertension    -  Primary ICD-10-CM: I10 
ICD-9-CM: 401.9 Pre-diabetes     ICD-10-CM: R73.03 
ICD-9-CM: 790.29 Vitals BP Pulse Temp Resp Height(growth percentile) Weight(growth percentile) 122/77 (BP 1 Location: Left arm, BP Patient Position: Sitting) 63 98.4 °F (36.9 °C) (Oral) 18 5' 1\" (1.549 m) 138 lb 4 oz (62.7 kg) LMP SpO2 BMI OB Status Smoking Status 05/07/2015 98% 26.12 kg/m2 Hysterectomy Never Smoker Vitals History BMI and BSA Data Body Mass Index Body Surface Area  
 26.12 kg/m 2 1.64 m 2 Preferred Pharmacy Pharmacy Name Phone [facility], [facility] [phone] Your Updated Medication List  
  
   
This list is accurate as of 5/29/18  9:02 AM.  Always use your most recent med list.  
  
  
  
  
 cetirizine 10 mg tablet Commonly known as:  ZYRTEC  
TAKE 1 TABLET BY MOUTH DAILY. clindinium-chlordiazePOXIDE 5-2.5 mg per capsule Commonly known as:  Jigar Notice Take 1 Cap by mouth two (2) times a day. Max Daily Amount: 2 Caps. clobetasol 0.05 % external solution Commonly known as:  TEMOVATE  
APPLY TO AFFECTED AREA TWICE A DAY FOR 30 DAYS DISCARD REMAINDER  
  
 desonide 0.05 % topical ointment Commonly known as:  Kaushal Carlisle Apply  to affected area two (2) times a day. diclofenac EC 75 mg EC tablet Commonly known as:  VOLTAREN Take 1 Tab by mouth two (2) times daily as needed. doxycycline 100 mg tablet Commonly known as:  ADOXA Take 1 Tab by mouth two (2) times a day. escitalopram oxalate 10 mg tablet Commonly known as:  Clyde Goes Take 1 Tab by mouth daily. metaxalone 800 mg tablet Commonly known as:  SKELAXIN Take 1 Tab by mouth three (3) times daily. mupirocin 2 % ointment Commonly known as:  [employer] Apply  to affected area two (2) times a day. pantoprazole 40 mg tablet Commonly known as:  PROTONIX  
TAKE 1 TAB BY MOUTH DAILY. salicylic acid 6 % Sham  
APPLY EXTERNALLY DAILY. TACLONEX 0.005-0.064 % Susp Generic drug:  Betamethasone-Calcipotriene  
  
 tamoxifen 20 mg tablet Commonly known as:  NOLVADEX TAKE 1 TABLET DAILY  
  
 triamcinolone acetonide 0.1 % ointment Commonly known as:  KENALOG Apply  to affected area two (2) times a day. use thin layer  
  
 valsartan-hydroCHLOROthiazide 80-12.5 mg per tablet Commonly known as:  DIOVAN-HCT Take 1 Tab by mouth daily. Indications: hypertension VITAMIN D3 2,000 unit Tab Generic drug:  cholecalciferol (vitamin D3) Take  by mouth daily. Prescriptions Sent to Pharmacy Refills  
 valsartan-hydroCHLOROthiazide (DIOVAN-HCT) 80-12.5 mg per tablet 3 Sig: Take 1 Tab by mouth daily. Indications: hypertension Class: Normal  
 Pharmacy: [address] [place] [address], [address] [place] [address] #: [phone] Route: Oral  
  
We Performed the Following HEMOGLOBIN A1C WITH EAG [60494 CPT(R)] METABOLIC PANEL, COMPREHENSIVE [51030 CPT(R)] Follow-up Instructions Return in about 1 year (around 5/29/2019), or if symptoms worsen or fail to improve. Introducing [facility] & HEALTH SERVICES! Dear Ricarda Mendoza: Thank you for requesting a SendRR account. Our records indicate that you already have an active SendRR account. You can access your account anytime at [email or web address] Did you know that you can access your hospital and ER discharge instructions at any time in SendRR? You can also review all of your test results from your hospital stay or ER visit. Additional Information If you have questions, please visit the Frequently Asked Questions section of the SendRR website at [email or web address] Remember, SendRR is NOT to be used for urgent needs. For medical emergencies, dial 911. Now available from your iPhone and Android! Please provide this summary of care documentation to your next provider. Your primary care clinician is listed as RODRIGO FERNANDEZ. If you have any questions after today's visit, please call [phone].

## 2018-05-30 LAB
ALBUMIN SERPL-MCNC: 4.1 G/DL (ref 3.5–5.5)
ALBUMIN/GLOB SERPL: 1.6 {RATIO} (ref 1.2–2.2)
ALP SERPL-CCNC: 38 IU/L (ref 39–117)
ALT SERPL-CCNC: 25 IU/L (ref 0–32)
AST SERPL-CCNC: 22 IU/L (ref 0–40)
BILIRUB SERPL-MCNC: 0.2 MG/DL (ref 0–1.2)
BUN SERPL-MCNC: 19 MG/DL (ref 6–24)
BUN/CREAT SERPL: 31 (ref 9–23)
CALCIUM SERPL-MCNC: 9 MG/DL (ref 8.7–10.2)
CHLORIDE SERPL-SCNC: 104 MMOL/L (ref 96–106)
CO2 SERPL-SCNC: 29 MMOL/L (ref 18–29)
CREAT SERPL-MCNC: 0.61 MG/DL (ref 0.57–1)
EST. AVERAGE GLUCOSE BLD GHB EST-MCNC: 105 MG/DL
GFR SERPLBLD CREATININE-BSD FMLA CKD-EPI: 101 ML/MIN/1.73
GFR SERPLBLD CREATININE-BSD FMLA CKD-EPI: 116 ML/MIN/1.73
GLOBULIN SER CALC-MCNC: 2.5 G/DL (ref 1.5–4.5)
GLUCOSE SERPL-MCNC: 91 MG/DL (ref 65–99)
HBA1C MFR BLD: 5.3 % (ref 4.8–5.6)
POTASSIUM SERPL-SCNC: 4.6 MMOL/L (ref 3.5–5.2)
PROT SERPL-MCNC: 6.6 G/DL (ref 6–8.5)
SODIUM SERPL-SCNC: 143 MMOL/L (ref 134–144)

## 2018-08-30 RX ORDER — ESCITALOPRAM OXALATE 10 MG/1
TABLET ORAL
Qty: 90 TAB | Refills: 1 | Status: SHIPPED | OUTPATIENT
Start: 2018-08-30 | End: 2019-03-09 | Stop reason: SDUPTHER

## 2019-01-07 RX ORDER — SALICYLIC ACID 6 MG/100ML
SHAMPOO TOPICAL
Qty: 177 ML | Refills: 5 | Status: SHIPPED | OUTPATIENT
Start: 2019-01-07 | End: 2019-09-11 | Stop reason: SDUPTHER

## 2019-01-07 RX ORDER — CLOBETASOL PROPIONATE 0.46 MG/ML
SOLUTION TOPICAL
Qty: 50 ML | Refills: 5 | Status: SHIPPED | OUTPATIENT
Start: 2019-01-07 | End: 2019-09-11 | Stop reason: SDUPTHER

## 2019-03-11 RX ORDER — ESCITALOPRAM OXALATE 10 MG/1
TABLET ORAL
Qty: 90 TAB | Refills: 1 | Status: SHIPPED | OUTPATIENT
Start: 2019-03-11 | End: 2019-06-03 | Stop reason: SDUPTHER

## 2019-04-17 RX ORDER — TAMOXIFEN CITRATE 20 MG/1
TABLET ORAL
Qty: 90 TAB | Refills: 3 | Status: SHIPPED | OUTPATIENT
Start: 2019-04-17 | End: 2021-02-04 | Stop reason: ALTCHOICE

## 2019-05-08 RX ORDER — CIPROFLOXACIN 250 MG/1
250 TABLET, FILM COATED ORAL EVERY 12 HOURS
Qty: 10 TAB | Refills: 0 | Status: SHIPPED | OUTPATIENT
Start: 2019-05-08 | End: 2019-05-13

## 2019-06-03 RX ORDER — ESCITALOPRAM OXALATE 10 MG/1
TABLET ORAL
Qty: 90 TAB | Refills: 0 | Status: SHIPPED | OUTPATIENT
Start: 2019-06-03 | End: 2021-02-04 | Stop reason: ALTCHOICE

## 2019-09-11 DIAGNOSIS — I10 ESSENTIAL HYPERTENSION: ICD-10-CM

## 2019-09-11 RX ORDER — VALSARTAN AND HYDROCHLOROTHIAZIDE 80; 12.5 MG/1; MG/1
1 TABLET, FILM COATED ORAL DAILY
Qty: 90 TAB | Refills: 0 | Status: SHIPPED | OUTPATIENT
Start: 2019-09-11 | End: 2020-03-10 | Stop reason: SDUPTHER

## 2019-09-12 RX ORDER — CLOBETASOL PROPIONATE 0.46 MG/ML
SOLUTION TOPICAL
Qty: 50 ML | Refills: 5 | Status: SHIPPED | OUTPATIENT
Start: 2019-09-12 | End: 2019-10-09 | Stop reason: SDUPTHER

## 2019-09-12 RX ORDER — SALICYLIC ACID 6 MG/100ML
SHAMPOO TOPICAL
Qty: 177 ML | Refills: 5 | Status: SHIPPED | OUTPATIENT
Start: 2019-09-12 | End: 2019-10-09 | Stop reason: SDUPTHER

## 2019-10-09 ENCOUNTER — OFFICE VISIT (OUTPATIENT)
Dept: FAMILY MEDICINE CLINIC | Age: 59
End: 2019-10-09

## 2019-10-09 VITALS
HEIGHT: 61 IN | TEMPERATURE: 98.2 F | DIASTOLIC BLOOD PRESSURE: 72 MMHG | OXYGEN SATURATION: 100 % | BODY MASS INDEX: 27.85 KG/M2 | WEIGHT: 147.5 LBS | HEART RATE: 79 BPM | RESPIRATION RATE: 16 BRPM | SYSTOLIC BLOOD PRESSURE: 127 MMHG

## 2019-10-09 DIAGNOSIS — I10 ESSENTIAL HYPERTENSION: Primary | ICD-10-CM

## 2019-10-09 DIAGNOSIS — D05.11 DUCTAL CARCINOMA IN SITU (DCIS) OF RIGHT BREAST: ICD-10-CM

## 2019-10-09 DIAGNOSIS — E55.9 VITAMIN D DEFICIENCY: ICD-10-CM

## 2019-10-09 DIAGNOSIS — R73.03 PRE-DIABETES: ICD-10-CM

## 2019-10-09 DIAGNOSIS — M79.7 FIBROMYALGIA: ICD-10-CM

## 2019-10-09 RX ORDER — SALICYLIC ACID 6 MG/100ML
SHAMPOO TOPICAL
Qty: 177 ML | Refills: 5 | Status: SHIPPED | OUTPATIENT
Start: 2019-10-09 | End: 2020-08-05 | Stop reason: SDUPTHER

## 2019-10-09 RX ORDER — CLOBETASOL PROPIONATE 0.46 MG/ML
SOLUTION TOPICAL
Qty: 150 ML | Refills: 5 | Status: SHIPPED | OUTPATIENT
Start: 2019-10-09

## 2019-10-09 NOTE — PROGRESS NOTES
Chief Complaint   Patient presents with    Hypertension     Headaches    Labs     Fasting today    Fibromyalgia     1. Have you been to the ER, urgent care clinic since your last visit? Hospitalized since your last visit? No    2. Have you seen or consulted any other health care providers outside of the [address] [place] [address] since your last visit? Include any pap smears or colon screening. No       Chief Complaint   Patient presents with    Hypertension     Headaches    Labs     Fasting today    Fibromyalgia     She is a 63 y.o. female who presents for evalution. Reviewed PmHx, RxHx, FmHx, SocHx, AllgHx and updated and dated in the chart. Patient Active Problem List    Diagnosis    Psoriasis    STEPHANIE (obstructive sleep apnea)    Esophageal stricture    DCIS (ductal carcinoma in situ)     2/2015 RIGHT lumpectomy for 6 mm DCIS grade 2, %. %  Tamoxifen      Pre-diabetes    Vitamin D deficiency    GERD (gastroesophageal reflux disease)    Depressive disorder, not elsewhere classified    Insomnia    Fibromyalgia    HTN (hypertension)    MVP (mitral valve prolapse)    IBS (irritable bowel syndrome)       Review of Systems - negative except as listed above in the HPI    Objective:     Vitals:    10/09/19 0858   BP: 127/72   Pulse: 79   Resp: 16   Temp: 98.2 °F (36.8 °C)   TempSrc: Oral   SpO2: 100%   Weight: 147 lb 8 oz (66.9 kg)   Height: 5' 1\" (1.549 m)     Physical Examination: General appearance - alert, well appearing, and in no distress  Neck - supple, no significant adenopathy  Chest - clear to auscultation, no wheezes, rales or rhonchi, symmetric air entry  Heart - normal rate, regular rhythm, normal S1, S2, no murmurs, rubs, clicks or gallops  Abdomen - soft, nontender, nondistended, no masses or organomegaly  Extremities - peripheral pulses normal, no pedal edema, no clubbing or cyanosis      Assessment/ Plan:   Diagnoses and all orders for this visit:    1.  Essential hypertension  -     LIPID PANEL  -     METABOLIC PANEL, COMPREHENSIVE  -     CBC WITH AUTOMATED DIFF  -     TSH 3RD GENERATION  -at goal    2. Pre-diabetes  -     METABOLIC PANEL, COMPREHENSIVE  -     HEMOGLOBIN A1C WITH EAG    3. Vitamin D deficiency  -     VITAMIN D, 25 HYDROXY    4. Fibromyalgia  -stable    5. Ductal carcinoma in situ (DCIS) of right breast  -seeing specialist    Other orders  -     clobetasol (TEMOVATE) 0.05 % external solution; APPLY TO AFFECTED AREA TWICE A DAY FOR 30 DAYS DISCARD REMAINDER       Follow-up and Dispositions    · Return in about 1 year (around 10/9/2020). I have discussed the diagnosis with the patient and the intended plan as seen in the above orders. The patient understands and agrees with the plan. The patient has received an after-visit summary and questions were answered concerning future plans. Medication Side Effects and Warnings were discussed with patient  Patient Labs were reviewed and or requested:  Patient Past Records were reviewed and or requested    Kim Castle M.D. There are no Patient Instructions on file for this visit.

## 2019-10-10 LAB
25(OH)D3+25(OH)D2 SERPL-MCNC: 35.8 NG/ML (ref 30–100)
ALBUMIN SERPL-MCNC: 4.3 G/DL (ref 3.5–5.5)
ALBUMIN/GLOB SERPL: 1.8 {RATIO} (ref 1.2–2.2)
ALP SERPL-CCNC: 41 IU/L (ref 39–117)
ALT SERPL-CCNC: 12 IU/L (ref 0–32)
AST SERPL-CCNC: 18 IU/L (ref 0–40)
BASOPHILS # BLD AUTO: 0 X10E3/UL (ref 0–0.2)
BASOPHILS NFR BLD AUTO: 1 %
BILIRUB SERPL-MCNC: <0.2 MG/DL (ref 0–1.2)
BUN SERPL-MCNC: 15 MG/DL (ref 6–24)
BUN/CREAT SERPL: 25 (ref 9–23)
CALCIUM SERPL-MCNC: 9.2 MG/DL (ref 8.7–10.2)
CHLORIDE SERPL-SCNC: 103 MMOL/L (ref 96–106)
CHOLEST SERPL-MCNC: 147 MG/DL (ref 100–199)
CO2 SERPL-SCNC: 24 MMOL/L (ref 20–29)
CREAT SERPL-MCNC: 0.61 MG/DL (ref 0.57–1)
EOSINOPHIL # BLD AUTO: 0.1 X10E3/UL (ref 0–0.4)
EOSINOPHIL NFR BLD AUTO: 2 %
ERYTHROCYTE [DISTWIDTH] IN BLOOD BY AUTOMATED COUNT: 13 % (ref 12.3–15.4)
EST. AVERAGE GLUCOSE BLD GHB EST-MCNC: 114 MG/DL
GLOBULIN SER CALC-MCNC: 2.4 G/DL (ref 1.5–4.5)
GLUCOSE SERPL-MCNC: 88 MG/DL (ref 65–99)
HBA1C MFR BLD: 5.6 % (ref 4.8–5.6)
HCT VFR BLD AUTO: 38 % (ref 34–46.6)
HDLC SERPL-MCNC: 63 MG/DL
HGB BLD-MCNC: 12.7 G/DL (ref 11.1–15.9)
IMM GRANULOCYTES # BLD AUTO: 0 X10E3/UL (ref 0–0.1)
IMM GRANULOCYTES NFR BLD AUTO: 0 %
INTERPRETATION, 910389: NORMAL
LDLC SERPL CALC-MCNC: 67 MG/DL (ref 0–99)
LYMPHOCYTES # BLD AUTO: 1.8 X10E3/UL (ref 0.7–3.1)
LYMPHOCYTES NFR BLD AUTO: 35 %
MCH RBC QN AUTO: 30 PG (ref 26.6–33)
MCHC RBC AUTO-ENTMCNC: 33.4 G/DL (ref 31.5–35.7)
MCV RBC AUTO: 90 FL (ref 79–97)
MONOCYTES # BLD AUTO: 0.4 X10E3/UL (ref 0.1–0.9)
MONOCYTES NFR BLD AUTO: 7 %
NEUTROPHILS # BLD AUTO: 2.8 X10E3/UL (ref 1.4–7)
NEUTROPHILS NFR BLD AUTO: 55 %
PLATELET # BLD AUTO: 170 X10E3/UL (ref 150–450)
POTASSIUM SERPL-SCNC: 4.4 MMOL/L (ref 3.5–5.2)
PROT SERPL-MCNC: 6.7 G/DL (ref 6–8.5)
RBC # BLD AUTO: 4.23 X10E6/UL (ref 3.77–5.28)
SODIUM SERPL-SCNC: 144 MMOL/L (ref 134–144)
TRIGL SERPL-MCNC: 84 MG/DL (ref 0–149)
TSH SERPL DL<=0.005 MIU/L-ACNC: 1.31 UIU/ML (ref 0.45–4.5)
VLDLC SERPL CALC-MCNC: 17 MG/DL (ref 5–40)
WBC # BLD AUTO: 5.1 X10E3/UL (ref 3.4–10.8)

## 2019-10-17 ENCOUNTER — TELEPHONE (OUTPATIENT)
Dept: ONCOLOGY | Age: 59
End: 2019-10-17

## 2019-10-17 NOTE — TELEPHONE ENCOUNTER
10/17/19 4:54 PM: Returned call to patient, who stated that her PCP has been refilling tamoxifen but she is supposed to stop the tamoxifen in February as this will be her five year pallavi of taking it. Patient wanted to know if she should still see Dr. Andreina Love for appointments and when she needs another mammogram. Advised patient that Dr. Andreina Love is out of the office until Monday but will be consulted then and the patient will receive a call back. Patient verbalized understanding and denied further questions or concerns. 10/22/19 2:56 PM: Called patient and advised that per Dr. Andreina Love, the patient will need yearly mammograms and these can be ordered by her PCP. Patient asked if she needs to ween off the tamoxifen or if she can just stop when she hits the five year pallavi of taking it; advised patient that per Rajan Joseph NP, she does not need to ween off the medication and can stop at the five year pallavi. Patient verbalized understanding and denied further questions or concerns.

## 2019-10-17 NOTE — TELEPHONE ENCOUNTER
Patient called and stated she is almost done with her 5 years of tamoxifen, and would like to discuss what her next steps should be for her treatment.  #[phone]

## 2019-12-06 LAB — MAMMOGRAPHY, EXTERNAL: NORMAL

## 2020-01-30 LAB — PAP SMEAR, EXTERNAL: NORMAL

## 2020-03-10 DIAGNOSIS — I10 ESSENTIAL HYPERTENSION: ICD-10-CM

## 2020-03-10 RX ORDER — VALSARTAN AND HYDROCHLOROTHIAZIDE 80; 12.5 MG/1; MG/1
1 TABLET, FILM COATED ORAL DAILY
Qty: 90 TAB | Refills: 0 | Status: SHIPPED | OUTPATIENT
Start: 2020-03-10 | End: 2020-08-05 | Stop reason: SDUPTHER

## 2020-08-05 DIAGNOSIS — I10 ESSENTIAL HYPERTENSION: ICD-10-CM

## 2020-08-06 RX ORDER — VALSARTAN AND HYDROCHLOROTHIAZIDE 80; 12.5 MG/1; MG/1
1 TABLET, FILM COATED ORAL DAILY
Qty: 90 TAB | Refills: 0 | Status: SHIPPED | OUTPATIENT
Start: 2020-08-06 | End: 2021-02-08 | Stop reason: SDUPTHER

## 2020-08-06 RX ORDER — SALICYLIC ACID 6 MG/100ML
SHAMPOO TOPICAL
Qty: 177 ML | Refills: 5 | Status: SHIPPED | OUTPATIENT
Start: 2020-08-06 | End: 2021-11-15 | Stop reason: SDUPTHER

## 2021-01-26 VITALS — WEIGHT: 150.8 LBS | HEIGHT: 61 IN | BODY MASS INDEX: 28.47 KG/M2

## 2021-01-26 PROBLEM — E66.9 OBESITY: Status: ACTIVE | Noted: 2021-01-26

## 2021-01-26 PROBLEM — Z78.0 MENOPAUSE: Status: ACTIVE | Noted: 2021-01-26

## 2021-01-26 PROBLEM — N60.99 ATYPICAL DUCTAL HYPERPLASIA OF BREAST: Status: ACTIVE | Noted: 2021-01-26

## 2021-01-26 PROBLEM — Z84.2 FAMILY HISTORY OF CERVICAL DYSPLASIA: Status: ACTIVE | Noted: 2021-01-26

## 2021-01-26 PROBLEM — C50.919: Status: ACTIVE | Noted: 2021-01-26

## 2021-02-03 VITALS — HEIGHT: 61 IN | BODY MASS INDEX: 28.49 KG/M2

## 2021-02-04 ENCOUNTER — OFFICE VISIT (OUTPATIENT)
Dept: OBGYN CLINIC | Age: 61
End: 2021-02-04
Payer: COMMERCIAL

## 2021-02-04 VITALS
SYSTOLIC BLOOD PRESSURE: 128 MMHG | HEIGHT: 61 IN | BODY MASS INDEX: 25.89 KG/M2 | DIASTOLIC BLOOD PRESSURE: 72 MMHG | WEIGHT: 137.13 LBS

## 2021-02-04 DIAGNOSIS — Z01.419 ROUTINE GYNECOLOGICAL EXAMINATION: ICD-10-CM

## 2021-02-04 DIAGNOSIS — Z90.711 SCREENING FOR MALIGNANT NEOPLASM OF VAGINA AFTER PARTIAL HYSTERECTOMY: Primary | ICD-10-CM

## 2021-02-04 DIAGNOSIS — N95.1 MENOPAUSAL SYNDROME: ICD-10-CM

## 2021-02-04 DIAGNOSIS — Z12.72 SCREENING FOR MALIGNANT NEOPLASM OF VAGINA AFTER PARTIAL HYSTERECTOMY: Primary | ICD-10-CM

## 2021-02-04 PROCEDURE — 99396 PREV VISIT EST AGE 40-64: CPT | Performed by: OBSTETRICS & GYNECOLOGY

## 2021-02-04 RX ORDER — METRONIDAZOLE 7.5 MG/G
CREAM TOPICAL
COMMUNITY
Start: 2020-12-03

## 2021-02-04 RX ORDER — SODIUM FLUORIDE/POTASSIUM NIT 1.1 %-5 %
PASTE (ML) DENTAL
COMMUNITY
Start: 2021-01-08

## 2021-02-04 NOTE — PROGRESS NOTES
Alex Faith is a , 62 y.o. female   Patient's last menstrual period was 2015. She presents for her annual    She is having no significant problems. being w/u for psoriatic arthrititis      Menstrual status:  Her periods are: Hysterectomy. Cycles are: Hysterectomy. She does not have dysmenorrhea. Medical conditions:  Since her last annual GYN exam about one year ago, she has not the following changes in her health history: none. Pap and Mammogram History:        [facility] Results (most recent):  Results from [place] encounter on 02/04/15   [facility] NDL/WIRE/CLIP/SEED BREAST RT    Narrative **Final Report**       ICD Codes / Adm. Diagnosis: 233.0   / Malignant neoplasm of breast (    Examination:  MM [facility] NDL/WIRE/CLIP/SD RT  - [id number] - 2015    9:07AM  Accession No:  [id number]  Reason:        REPORT:  INDICATION: LCIS    TECHNIQUE: The risks and benefits of the procedure were discussed with the   patient. Written consent was obtained. Preliminary mammographic imaging of   the right breast again demonstrated a biopsy clip in the superior medial   breast at posterior depth. The lesion was targeted mammographically using a   medial approach. The patient's skin was prepped with alcohol. 1% lidocaine   was injected locally. A 20-gauge Kopans needle was advanced into the breast   under digital mammographic guidance. An orthogonal projection was then   obtained to confirm appropriate needle depth. A hook wire was then deployed   at the target. The needle was then removed. Postprocedure ML and CC views   demonstrate accurate wire positioning. The patient tolerated the procedure   well. There were no immediate complications. IMPRESSION: Successful digital mammographic guided right breast needle   localization. The wire is in accurate position. Specimen radiograph is   recommended.               Signing/Reading Doctor: Geoffrey Ham ([id number])    Approved: Geoffrey Ham ([id number])  Feb 4 2015  9:27AM                                       DEXA Results (most recent):  No results found for this or any previous visit. Past Medical History:   Diagnosis Date    Arthritis     Breast atypical hyperplasia 6/14/2010    LEFT atypical lobular hyperplasia 2009    Chronic pain     fibromyalgia widespread pain    DM (diabetes mellitus) ([facility] [address] 6/14/2010    pre-diabetes no meds    Fibromyalgia 6/14/2010    GERD (gastroesophageal reflux disease)     HTN (hypertension) 6/14/2010    IBS (irritable bowel syndrome) 6/14/2010    Ill-defined condition     esophageal stricture  esophageal diliation 11/2014    MVP (mitral valve prolapse) 6/14/2010    Retinal detachment     Left eye     Past Surgical History:   Procedure Laterality Date    HX BREAST BIOPSY Right 2/4/2015    RIGHT BREAST EXCISIONAL BIOPSY WITH RIGHT NEEDLE LOCALIZATION performed by Perez Montes MD at [address] [place] [address] HX BREAST LUMPECTOMY      HX CARPAL TUNNEL RELEASE      HX CHOLECYSTECTOMY  1980's    HX CHOLECYSTECTOMY      HX COLONOSCOPY  10/01/2015    HX GYN      hyst    HX ORTHOPAEDIC  2000,2006    carpal tunnel bilateral    HX RETINAL DETACHMENT REPAIR      [place] BREAST SURGERY PROCEDURE UNLISTED  2009    Biopsy left       Prior to Admission medications    Medication Sig Start Date End Date Taking? Authorizing Provider   metroNIDAZOLE (METROCREAM) 0.75 % topical cream APPLY CREAM TO FACE TWICE A DAY 12/3/20  Yes Provider, Historical   PreviDent 5000 Sensitive 1.1-5 % pste BRUSH TWO TIMES A DAY. SPIT, DO NOT RINSE 1/8/21  Yes Provider, Historical   calcium carbonate/vitamin D3 (CALCIUM 500 + D, D3, PO) Take  by mouth. Yes Provider, Historical   salicylic acid 6 % sham APPLY EXTERNALLY DAILY. 8/6/20  Yes Enid Bear MD   valsartan-hydroCHLOROthiazide (DIOVAN-HCT) 80-12.5 mg per tablet Take 1 Tab by mouth daily.  Indications: high blood pressure 8/6/20  Yes Enid Bear MD   clobetasol (TEMOVATE) 0.05 % external solution APPLY TO AFFECTED AREA TWICE A DAY FOR 30 DAYS DISCARD REMAINDER 10/9/19  Yes Solomon Velázquez MD   pirocin [facility]) 2 % ointment Apply  to affected area two (2) times a day. 4/12/18  Yes Alberta Bearden DO   metaxalone (SKELAXIN) 800 mg tablet Take 1 Tab by mouth three (3) times daily. Patient taking differently: Take 800 mg by mouth three (3) times daily as needed. 11/27/17  Yes Solomon Velázquez MD   desonide (DESOWEN) 0.05 % topical ointment Apply  to affected area two (2) times a day. Yes Provider, Historical   triamcinolone acetonide (KENALOG) 0.1 % ointment Apply  to affected area two (2) times a day. use thin layer   Yes Provider, Historical   cholecalciferol, vitamin D3, (VITAMIN D3) 2,000 unit tab Take  by mouth daily. Yes Provider, Historical   cetirizine (ZYRTEC) 10 mg tablet TAKE 1 TABLET BY MOUTH DAILY. 7/25/16  Yes Solomon Velázquez MD   TACLONEX 0.005-0.064 % susp  1/5/16  Yes Provider, Historical       Allergies   Allergen Reactions    Amoxicillin Anaphylaxis    Empirin Palpitations     Numbness and tingling in extremities    Mepergan Other (comments)     Numbness and tingling of extremities    Meperidine Other (comments)     Numbness and tingling of extremities    Promethazine Palpitations     Numbness and tingling of extremities    Savella [Milnacipran] Palpitations    Lyrica [Pregabalin] Other (comments)     Caused muenstral cycle to be sporadic          Tobacco History:  reports that she has never smoked. She has never used smokeless tobacco.  Alcohol Abuse:  reports no history of alcohol use. Drug Abuse:  reports no history of drug use.     Family Medical/Cancer History:   Family History   Problem Relation Age of Onset    Heart Disease Mother     Diabetes Father     Cancer Son 16        osteosarcoma met to lung    Cancer Maternal Aunt 48        lung     Hypertension Other     Diabetes Other           Review of Systems   Constitutional: Negative for chills, fever, malaise/fatigue and weight loss. HENT: Negative for congestion, ear pain, sinus pain and tinnitus. Eyes: Negative for blurred vision and double vision. Respiratory: Negative for cough, shortness of breath and wheezing. Cardiovascular: Negative for chest pain and palpitations. Gastrointestinal: Negative for abdominal pain, blood in stool, constipation, diarrhea, heartburn, nausea and vomiting. Genitourinary: Negative for dysuria, flank pain, frequency, hematuria and urgency. Musculoskeletal: Negative for joint pain and myalgias. Skin: Negative for itching and rash. Neurological: Negative for dizziness, weakness and headaches. Psychiatric/Behavioral: Negative for depression, memory loss and suicidal ideas. The patient is not nervous/anxious and does not have insomnia. Physical Exam  Constitutional:       Appearance: Normal appearance. HENT:      Head: Normocephalic and atraumatic. Cardiovascular:      Rate and Rhythm: Normal rate. Heart sounds: Normal heart sounds. Pulmonary:      Effort: Pulmonary effort is normal.      Breath sounds: Normal breath sounds. Chest:      Breasts:         Right: Normal.         Left: Normal.   Abdominal:      General: Abdomen is flat. Palpations: Abdomen is soft. Genitourinary:     General: Normal vulva. Vagina: Normal.      Uterus: Absent. Adnexa: Right adnexa normal and left adnexa normal.      Rectum: Normal.      Comments: PAP Obtained  Neurological:      Mental Status: She is alert. Psychiatric:         Mood and Affect: Mood normal.         Behavior: Behavior normal.         Thought Content: Thought content normal.          Visit Vitals  /72 (BP 1 Location: Left upper arm, BP Patient Position: Sitting)   Ht 5' 1\" (1.549 m)   Wt 137 lb 2 oz (62.2 kg)   LMP 05/07/2015   BMI 25.91 kg/m²         Assessment:  Diagnoses and all orders for this visit:    1.  Screening for malignant neoplasm of vagina after partial hysterectomy  -     PAP IG, RFX APTIMA HPV ASCUS ([id number])    2. Routine gynecological examination  -     PAP IG, RFX APTIMA HPV ASCUS ([id number])    3. Menopausal syndrome        Plan:Questions addressed   Counseled re: diet, exercise, healthy lifestyle  Return for Annual  Rec annual mammogram        Follow-up and Dispositions    · Return for 1 yr annual, 1 yr mammo.

## 2021-02-05 LAB
CYTOLOGIST CVX/VAG CYTO: NORMAL
CYTOLOGY CVX/VAG DOC CYTO: NORMAL
CYTOLOGY CVX/VAG DOC THIN PREP: NORMAL
DX ICD CODE: NORMAL
LABCORP, 190119: NORMAL
Lab: NORMAL
OTHER STN SPEC: NORMAL
STAT OF ADQ CVX/VAG CYTO-IMP: NORMAL

## 2021-02-25 ENCOUNTER — OFFICE VISIT (OUTPATIENT)
Dept: OBGYN CLINIC | Age: 61
End: 2021-02-25
Payer: COMMERCIAL

## 2021-02-25 DIAGNOSIS — Z12.31 VISIT FOR SCREENING MAMMOGRAM: Primary | ICD-10-CM

## 2021-02-25 PROCEDURE — 77067 SCR MAMMO BI INCL CAD: CPT | Performed by: OBSTETRICS & GYNECOLOGY

## 2021-11-05 DIAGNOSIS — I10 ESSENTIAL HYPERTENSION: ICD-10-CM

## 2021-11-08 DIAGNOSIS — I10 ESSENTIAL HYPERTENSION: ICD-10-CM

## 2021-11-08 RX ORDER — VALSARTAN AND HYDROCHLOROTHIAZIDE 80; 12.5 MG/1; MG/1
1 TABLET, FILM COATED ORAL DAILY
Qty: 89 TABLET | Refills: 0 | Status: SHIPPED | OUTPATIENT
Start: 2021-11-08 | End: 2021-11-08 | Stop reason: SDUPTHER

## 2021-11-08 RX ORDER — VALSARTAN AND HYDROCHLOROTHIAZIDE 80; 12.5 MG/1; MG/1
1 TABLET, FILM COATED ORAL DAILY
Qty: 89 TABLET | Refills: 0 | Status: SHIPPED | OUTPATIENT
Start: 2021-11-08 | End: 2022-04-10 | Stop reason: SDUPTHER

## 2021-11-15 ENCOUNTER — OFFICE VISIT (OUTPATIENT)
Dept: FAMILY MEDICINE CLINIC | Age: 61
End: 2021-11-15
Payer: COMMERCIAL

## 2021-11-15 VITALS
HEART RATE: 79 BPM | WEIGHT: 136 LBS | OXYGEN SATURATION: 100 % | TEMPERATURE: 98.3 F | BODY MASS INDEX: 25.68 KG/M2 | RESPIRATION RATE: 16 BRPM | HEIGHT: 61 IN | DIASTOLIC BLOOD PRESSURE: 74 MMHG | SYSTOLIC BLOOD PRESSURE: 134 MMHG

## 2021-11-15 DIAGNOSIS — I10 PRIMARY HYPERTENSION: Primary | ICD-10-CM

## 2021-11-15 DIAGNOSIS — M79.7 FIBROMYALGIA: ICD-10-CM

## 2021-11-15 DIAGNOSIS — M54.50 CHRONIC BILATERAL LOW BACK PAIN WITHOUT SCIATICA: ICD-10-CM

## 2021-11-15 DIAGNOSIS — E55.9 VITAMIN D DEFICIENCY: ICD-10-CM

## 2021-11-15 DIAGNOSIS — R73.9 ELEVATED BLOOD SUGAR: ICD-10-CM

## 2021-11-15 DIAGNOSIS — R73.03 PRE-DIABETES: ICD-10-CM

## 2021-11-15 DIAGNOSIS — G89.29 CHRONIC BILATERAL LOW BACK PAIN WITHOUT SCIATICA: ICD-10-CM

## 2021-11-15 PROCEDURE — 99214 OFFICE O/P EST MOD 30 MIN: CPT | Performed by: FAMILY MEDICINE

## 2021-11-15 RX ORDER — SALICYLIC ACID 6 MG/100ML
SHAMPOO TOPICAL
Qty: 177 ML | Refills: 5 | Status: SHIPPED | OUTPATIENT
Start: 2021-11-15 | End: 2022-04-10 | Stop reason: SDUPTHER

## 2021-11-15 RX ORDER — METAXALONE 800 MG/1
800 TABLET ORAL 3 TIMES DAILY
Qty: 90 TABLET | Refills: 11 | Status: SHIPPED | OUTPATIENT
Start: 2021-11-15

## 2021-11-15 NOTE — PROGRESS NOTES
Chief Complaint   Patient presents with    Hypertension    Labs     Fasting today    Medication Refill    Arthritis     Cyst removal.    1. Have you been to the ER, urgent care clinic since your last visit? Hospitalized since your last visit? No    2. Have you seen or consulted any other health care providers outside of the [address] [place] [address] since your last visit? Include any pap smears or colon screening. No         Chief Complaint   Patient presents with    Hypertension    Labs     Fasting today    Medication Refill    Arthritis     She is a 62 y.o. female who presents for evalution. Reviewed PmHx, RxHx, FmHx, SocHx, AllgHx and updated and dated in the chart. Patient Active Problem List    Diagnosis    Atypical ductal hyperplasia of breast    Family history of cervical dysplasia    Infiltrating lobular carcinoma of breast in female ([facility] [address]    Menopause    Obesity    Psoriasis    STEPHANIE (obstructive sleep apnea)    Esophageal stricture    DCIS (ductal carcinoma in situ)     2/2015 RIGHT lumpectomy for 6 mm DCIS grade 2, %. %  Tamoxifen      Pre-diabetes    Vitamin D deficiency    GERD (gastroesophageal reflux disease)    Depressive disorder, not elsewhere classified    Insomnia    Fibromyalgia    HTN (hypertension)    MVP (mitral valve prolapse)    IBS (irritable bowel syndrome)       Review of Systems - negative except as listed above in the HPI    Objective:     Vitals:    11/15/21 0843 11/15/21 0859   BP: (!) 144/75 134/74   Pulse: 79    Resp: 16    Temp: 98.3 °F (36.8 °C)    TempSrc: Oral    SpO2: 100%    Weight: 136 lb (61.7 kg)    Height: 5' 1\" (1.549 m)          Assessment/ Plan:   Diagnoses and all orders for this visit:    1. Primary hypertension  -     LIPID PANEL; Future  -     METABOLIC PANEL, COMPREHENSIVE; Future  -at goal    2. Fibromyalgia  -     metaxalone (Skelaxin) 800 mg tablet;  Take 1 Tablet by mouth three (3) times daily.  -     IRON PROFILE; Future    3. Chronic bilateral low back pain without sciatica  -     metaxalone (Skelaxin) 800 mg tablet; Take 1 Tablet by mouth three (3) times daily. 4. Elevated blood sugar  -     METABOLIC PANEL, COMPREHENSIVE; Future  -     HEMOGLOBIN A1C WITH EAG; Future    5. Pre-diabetes  -     LIPID PANEL; Future  -     METABOLIC PANEL, COMPREHENSIVE; Future  -     CBC WITH AUTOMATED DIFF; Future  -     TSH 3RD GENERATION; Future  -     HEMOGLOBIN A1C WITH EAG; Future  Lab Results   Component Value Date/Time    Hemoglobin A1c 5.6 10/09/2019 09:35 AM       6. Vitamin D deficiency  -     VITAMIN D, 25 HYDROXY; Future    Other orders  -     salicylic acid 6 % sham; APPLY EXTERNALLY DAILY. I have discussed the diagnosis with the patient and the intended plan as seen in the above orders. The patient understands and agrees with the plan. The patient has received an after-visit summary and questions were answered concerning future plans. Medication Side Effects and Warnings were discussed with patient  Patient Labs were reviewed and or requested:  Patient Past Records were reviewed and or requested    Isidoro Meneses M.D. There are no Patient Instructions on file for this visit.

## 2021-11-16 LAB
25(OH)D3+25(OH)D2 SERPL-MCNC: 36.1 NG/ML (ref 30–100)
ALBUMIN SERPL-MCNC: 4.7 G/DL (ref 3.8–4.9)
ALBUMIN/GLOB SERPL: 2.2 {RATIO} (ref 1.2–2.2)
ALP SERPL-CCNC: 58 IU/L (ref 44–121)
ALT SERPL-CCNC: 17 IU/L (ref 0–32)
AST SERPL-CCNC: 20 IU/L (ref 0–40)
BASOPHILS # BLD AUTO: 0 X10E3/UL (ref 0–0.2)
BASOPHILS NFR BLD AUTO: 0 %
BILIRUB SERPL-MCNC: 0.3 MG/DL (ref 0–1.2)
BUN SERPL-MCNC: 11 MG/DL (ref 8–27)
BUN/CREAT SERPL: 25 (ref 12–28)
CALCIUM SERPL-MCNC: 9.1 MG/DL (ref 8.7–10.3)
CHLORIDE SERPL-SCNC: 103 MMOL/L (ref 96–106)
CHOLEST SERPL-MCNC: 162 MG/DL (ref 100–199)
CO2 SERPL-SCNC: 26 MMOL/L (ref 20–29)
CREAT SERPL-MCNC: 0.44 MG/DL (ref 0.57–1)
EOSINOPHIL # BLD AUTO: 0 X10E3/UL (ref 0–0.4)
EOSINOPHIL NFR BLD AUTO: 1 %
ERYTHROCYTE [DISTWIDTH] IN BLOOD BY AUTOMATED COUNT: 12.5 % (ref 11.7–15.4)
EST. AVERAGE GLUCOSE BLD GHB EST-MCNC: 117 MG/DL
GLOBULIN SER CALC-MCNC: 2.1 G/DL (ref 1.5–4.5)
GLUCOSE SERPL-MCNC: 97 MG/DL (ref 65–99)
HBA1C MFR BLD: 5.7 % (ref 4.8–5.6)
HCT VFR BLD AUTO: 40.7 % (ref 34–46.6)
HDLC SERPL-MCNC: 63 MG/DL
HGB BLD-MCNC: 13.5 G/DL (ref 11.1–15.9)
IMM GRANULOCYTES # BLD AUTO: 0 X10E3/UL (ref 0–0.1)
IMM GRANULOCYTES NFR BLD AUTO: 1 %
IMP & REVIEW OF LAB RESULTS: NORMAL
IRON SATN MFR SERPL: 33 % (ref 15–55)
IRON SERPL-MCNC: 102 UG/DL (ref 27–159)
LDLC SERPL CALC-MCNC: 79 MG/DL (ref 0–99)
LYMPHOCYTES # BLD AUTO: 1.7 X10E3/UL (ref 0.7–3.1)
LYMPHOCYTES NFR BLD AUTO: 35 %
MCH RBC QN AUTO: 30.7 PG (ref 26.6–33)
MCHC RBC AUTO-ENTMCNC: 33.2 G/DL (ref 31.5–35.7)
MCV RBC AUTO: 93 FL (ref 79–97)
MONOCYTES # BLD AUTO: 0.4 X10E3/UL (ref 0.1–0.9)
MONOCYTES NFR BLD AUTO: 8 %
NEUTROPHILS # BLD AUTO: 2.7 X10E3/UL (ref 1.4–7)
NEUTROPHILS NFR BLD AUTO: 55 %
PLATELET # BLD AUTO: 157 X10E3/UL (ref 150–450)
POTASSIUM SERPL-SCNC: 4.1 MMOL/L (ref 3.5–5.2)
PROT SERPL-MCNC: 6.8 G/DL (ref 6–8.5)
RBC # BLD AUTO: 4.4 X10E6/UL (ref 3.77–5.28)
SODIUM SERPL-SCNC: 143 MMOL/L (ref 134–144)
TIBC SERPL-MCNC: 310 UG/DL (ref 250–450)
TRIGL SERPL-MCNC: 111 MG/DL (ref 0–149)
TSH SERPL DL<=0.005 MIU/L-ACNC: 1.64 UIU/ML (ref 0.45–4.5)
UIBC SERPL-MCNC: 208 UG/DL (ref 131–425)
VLDLC SERPL CALC-MCNC: 20 MG/DL (ref 5–40)
WBC # BLD AUTO: 4.8 X10E3/UL (ref 3.4–10.8)

## 2021-12-08 ENCOUNTER — DOCUMENTATION ONLY (OUTPATIENT)
Dept: FAMILY MEDICINE CLINIC | Age: 61
End: 2021-12-08

## 2021-12-08 NOTE — PROGRESS NOTES
PA for Metaxalone approved from 11/30/21 - 11/30/22, copy faxed to pharmacy & Copy placed in scan folder to be scanned to chart.

## 2022-02-10 ENCOUNTER — OFFICE VISIT (OUTPATIENT)
Dept: OBGYN CLINIC | Age: 62
End: 2022-02-10
Payer: COMMERCIAL

## 2022-02-10 VITALS
HEIGHT: 61 IN | SYSTOLIC BLOOD PRESSURE: 138 MMHG | DIASTOLIC BLOOD PRESSURE: 76 MMHG | BODY MASS INDEX: 25.94 KG/M2 | WEIGHT: 137.38 LBS

## 2022-02-10 DIAGNOSIS — Z01.419 ROUTINE GYNECOLOGICAL EXAMINATION: ICD-10-CM

## 2022-02-10 DIAGNOSIS — Z85.3 PERSONAL HISTORY OF BREAST CANCER: ICD-10-CM

## 2022-02-10 DIAGNOSIS — Z12.72 ENCOUNTER FOR SCREENING FOR MALIGNANT NEOPLASM OF VAGINA: Primary | ICD-10-CM

## 2022-02-10 DIAGNOSIS — N95.1 MENOPAUSAL SYNDROME: ICD-10-CM

## 2022-02-10 PROCEDURE — 99396 PREV VISIT EST AGE 40-64: CPT | Performed by: OBSTETRICS & GYNECOLOGY

## 2022-02-10 NOTE — PROGRESS NOTES
Sugar Molina is a , 62 y.o. female   Patient's last menstrual period was 2015. She presents for her annual    She is having no significant problems. Menstrual status:  Cycles are hysterectomy. Flow: absent. She does not have dysmenorrhea. Medical conditions:  Since her last annual GYN exam about one year ago, she has not the following changes in her health history: none. Mammogram History:    ROBBY Results (most recent):  Results from Office Visit encounter on 21    R[facility]BY MAMMO BI SCREENING INCL CAD       DEXA Results (most recent):  No results found for this or any previous visit. Past Medical History:   Diagnosis Date    Arthritis     Breast atypical hyperplasia 2010    LEFT atypical lobular hyperplasia     Chronic pain     fibromyalgia widespread pain    DM (diabetes mellitus) ([address] 2010    pre-diabetes no meds    Fibromyalgia 2010    GERD (gastroesophageal reflux disease)     HTN (hypertension) 2010    IBS (irritable bowel syndrome) 2010    Ill-defined condition     esophageal stricture  esophageal diliation 2014    MVP (mitral valve prolapse) 2010    Retinal detachment     Left eye     Past Surgical History:   Procedure Laterality Date    HX BREAST BIOPSY Right 2015    RIGHT BREAST EXCISIONAL BIOPSY WITH RIGHT NEEDLE LOCALIZATION performed by Shaheen Garcia MD at [facility] [address] HX BREAST LUMPECTOMY      HX CARPAL TUNNEL RELEASE      HX CHOLECYSTECTOMY      HX CHOLECYSTECTOMY      HX COLONOSCOPY  10/01/2015    HX GYN      hyst    HX ORTHOPAEDIC  ,    carpal tunnel bilateral    HX RETINAL DETACHMENT REPAIR      [place] BREAST SURGERY PROCEDURE UNLISTED  2009    Biopsy left       Prior to Admission medications    Medication Sig Start Date End Date Taking? Authorizing Provider   salicylic acid 6 % sham APPLY EXTERNALLY DAILY.  11/15/21  Yes Saray Dudley MD   metaxalone (Skelaxin) 800 mg tablet Take 1 Tablet by mouth three (3) times daily. 11/15/21  Yes Red Guzmán MD   valsartan-hydroCHLOROthiazide (DIOVAN-HCT) 80-12.5 mg per tablet Take 1 Tablet by mouth daily. Indications: high blood pressure 11/8/21  Yes Red Guzmán MD   metroNIDAZOLE (METROCREAM) 0.75 % topical cream APPLY CREAM TO FACE TWICE A DAY 12/3/20  Yes Provider, Historical   PreviDent 5000 Sensitive 1.1-5 % pste BRUSH TWO TIMES A DAY. SPIT, DO NOT RINSE 1/8/21  Yes Provider, Historical   calcium carbonate/vitamin D3 (CALCIUM 500 + D, D3, PO) Take  by mouth. Yes Provider, Historical   clobetasol (TEMOVATE) 0.05 % external solution APPLY TO AFFECTED AREA TWICE A DAY FOR 30 DAYS DISCARD REMAINDER 10/9/19  Yes Red Guzmán MD   mupirocin [facility]) 2 % ointment Apply  to affected area two (2) times a day. 4/12/18  Yes Alberta Bearden,    desonide (DESOWEN) 0.05 % topical ointment Apply  to affected area two (2) times a day. Yes Provider, Historical   triamcinolone acetonide (KENALOG) 0.1 % ointment Apply  to affected area two (2) times a day. use thin layer   Yes Provider, Historical   cholecalciferol, vitamin D3, (VITAMIN D3) 2,000 unit tab Take  by mouth daily. Yes Provider, Historical   cetirizine (ZYRTEC) 10 mg tablet TAKE 1 TABLET BY MOUTH DAILY. 7/25/16  Yes Red Guzmán MD   TACLONEX 0.005-0.064 % susp  1/5/16  Yes Provider, Historical       Allergies   Allergen Reactions    Amoxicillin Anaphylaxis    Empirin Palpitations     Numbness and tingling in extremities    Mepergan Other (comments)     Numbness and tingling of extremities    Meperidine Other (comments)     Numbness and tingling of extremities    Promethazine Palpitations     Numbness and tingling of extremities    Savella [Milnacipran] Palpitations    Lyrica [Pregabalin] Other (comments)     Caused muenstral cycle to be sporadic          Tobacco History:  reports that she has never smoked.  She has never used smokeless tobacco.  Alcohol Abuse: reports no history of alcohol use. Drug Abuse:  reports no history of drug use. Family Medical/Cancer History:   Family History   Problem Relation Age of Onset    Heart Disease Mother     Diabetes Father     Cancer Son 17        osteosarcoma met to lung    Cancer Maternal Aunt 48        lung     Hypertension Other     Diabetes Other           Review of Systems   Constitutional: Negative for chills, fever, malaise/fatigue and weight loss. HENT: Negative for congestion, ear pain, sinus pain and tinnitus. Eyes: Negative for blurred vision and double vision. Respiratory: Negative for cough, shortness of breath and wheezing. Cardiovascular: Negative for chest pain and palpitations. Gastrointestinal: Negative for abdominal pain, blood in stool, constipation, diarrhea, heartburn, nausea and vomiting. Genitourinary: Negative for dysuria, flank pain, frequency, hematuria and urgency. Musculoskeletal: Negative for joint pain and myalgias. Skin: Negative for itching and rash. Neurological: Negative for dizziness, weakness and headaches. Psychiatric/Behavioral: Negative for depression, memory loss and suicidal ideas. The patient is not nervous/anxious and does not have insomnia. Physical Exam  Constitutional:       Appearance: Normal appearance. HENT:      Head: Normocephalic and atraumatic. Cardiovascular:      Rate and Rhythm: Normal rate. Heart sounds: Normal heart sounds. Pulmonary:      Effort: Pulmonary effort is normal.      Breath sounds: Normal breath sounds. Chest:   Breasts:      Right: Normal.      Left: Normal.       Abdominal:      General: Abdomen is flat. Palpations: Abdomen is soft. Genitourinary:     General: Normal vulva. Vagina: Normal.      Uterus: Absent. Adnexa: Right adnexa normal and left adnexa normal.      Rectum: Normal.      Comments: PAP Obtained  Neurological:      Mental Status: She is alert.    Psychiatric:         Mood and Affect: Mood normal.         Behavior: Behavior normal.         Thought Content: Thought content normal.          Visit Vitals  /76 (BP 1 Location: Left upper arm, BP Patient Position: Sitting, BP Cuff Size: Small adult)   Ht 5' 1\" (1.549 m)   Wt 137 lb 6 oz (62.3 kg)   LMP 05/07/2015   BMI 25.96 kg/m²         Assessment:   Diagnoses and all orders for this visit:    1. Encounter for screening for malignant neoplasm of vagina  -     PAP IG, RFX APTIMA HPV ASCUS ([id number])    2. Routine gynecological examination  -     PAP IG, RFX APTIMA HPV ASCUS ([id number])    3. Menopausal syndrome    4. Personal history of breast cancer        Plan: Questions addressed  Counseled re: diet, exercise, healthy lifestyle  Return for Annual  Rec annual mammogram        Follow-up and Dispositions    · Return for Mammogram, 1 yr annual, 1 yr mammo.

## 2022-02-10 NOTE — PROGRESS NOTES
Chief Complaint   Patient presents with    Annual Exam     mammo 2-15-22     Visit Vitals  /76 (BP 1 Location: Left upper arm, BP Patient Position: Sitting, BP Cuff Size: Small adult)   Ht 5' 1\" (1.549 m)   Wt 137 lb 6 oz (62.3 kg)   LMP 05/07/2015   BMI 25.96 kg/m² Unable to Assess

## 2022-03-19 PROBLEM — Z85.3 PERSONAL HISTORY OF BREAST CANCER: Status: ACTIVE | Noted: 2022-02-10

## 2022-03-19 PROBLEM — C50.919: Status: ACTIVE | Noted: 2021-01-26

## 2022-03-19 PROBLEM — E66.9 OBESITY: Status: ACTIVE | Noted: 2021-01-26

## 2022-03-19 PROBLEM — Z84.2 FAMILY HISTORY OF CERVICAL DYSPLASIA: Status: ACTIVE | Noted: 2021-01-26

## 2022-03-19 PROBLEM — Z78.0 MENOPAUSE: Status: ACTIVE | Noted: 2021-01-26

## 2022-03-20 PROBLEM — N60.99 ATYPICAL DUCTAL HYPERPLASIA OF BREAST: Status: ACTIVE | Noted: 2021-01-26

## 2022-04-10 DIAGNOSIS — I10 ESSENTIAL HYPERTENSION: ICD-10-CM

## 2022-04-11 RX ORDER — SALICYLIC ACID 6 MG/100ML
SHAMPOO TOPICAL
Qty: 177 ML | Refills: 5 | Status: SHIPPED | OUTPATIENT
Start: 2022-04-11

## 2022-04-11 RX ORDER — VALSARTAN AND HYDROCHLOROTHIAZIDE 80; 12.5 MG/1; MG/1
1 TABLET, FILM COATED ORAL DAILY
Qty: 89 TABLET | Refills: 0 | Status: SHIPPED | OUTPATIENT
Start: 2022-04-11 | End: 2022-10-10 | Stop reason: SDUPTHER

## 2022-10-10 DIAGNOSIS — I10 ESSENTIAL HYPERTENSION: ICD-10-CM

## 2022-10-10 RX ORDER — VALSARTAN AND HYDROCHLOROTHIAZIDE 80; 12.5 MG/1; MG/1
1 TABLET, FILM COATED ORAL DAILY
Qty: 89 TABLET | Refills: 0 | Status: SHIPPED | OUTPATIENT
Start: 2022-10-10 | End: 2022-10-25 | Stop reason: SDUPTHER

## 2022-10-25 DIAGNOSIS — I10 ESSENTIAL HYPERTENSION: ICD-10-CM

## 2022-10-25 RX ORDER — VALSARTAN AND HYDROCHLOROTHIAZIDE 80; 12.5 MG/1; MG/1
1 TABLET, FILM COATED ORAL DAILY
Qty: 89 TABLET | Refills: 0 | Status: SHIPPED | OUTPATIENT
Start: 2022-10-25 | End: 2022-11-01 | Stop reason: SDUPTHER

## 2022-11-15 ENCOUNTER — OFFICE VISIT (OUTPATIENT)
Dept: FAMILY MEDICINE CLINIC | Age: 62
End: 2022-11-15
Payer: COMMERCIAL

## 2022-11-15 VITALS
WEIGHT: 137 LBS | HEIGHT: 61 IN | DIASTOLIC BLOOD PRESSURE: 68 MMHG | HEART RATE: 64 BPM | SYSTOLIC BLOOD PRESSURE: 129 MMHG | TEMPERATURE: 98.4 F | RESPIRATION RATE: 14 BRPM | OXYGEN SATURATION: 99 % | BODY MASS INDEX: 25.86 KG/M2

## 2022-11-15 DIAGNOSIS — R73.03 PRE-DIABETES: ICD-10-CM

## 2022-11-15 DIAGNOSIS — I10 PRIMARY HYPERTENSION: Primary | ICD-10-CM

## 2022-11-15 PROCEDURE — 3074F SYST BP LT 130 MM HG: CPT | Performed by: FAMILY MEDICINE

## 2022-11-15 PROCEDURE — 3078F DIAST BP <80 MM HG: CPT | Performed by: FAMILY MEDICINE

## 2022-11-15 PROCEDURE — 99214 OFFICE O/P EST MOD 30 MIN: CPT | Performed by: FAMILY MEDICINE

## 2022-11-15 NOTE — PROGRESS NOTES
Chief Complaint   Patient presents with    Hypertension    Labs     Fasting today     1. Have you been to the ER, urgent care clinic since your last visit? Hospitalized since your last visit? Yes [facility]: [facility] UTI    2. Have you seen or consulted any other health care providers outside of the [address] [place] [address] since your last visit? Include any pap smears or colon screening. No         Chief Complaint   Patient presents with    Hypertension    Labs     Fasting today     She is a 65 y.o. female who presents for evalution. Reviewed PmHx, RxHx, FmHx, SocHx, AllgHx and updated and dated in the chart. Patient Active Problem List    Diagnosis    Personal history of breast cancer    Atypical ductal hyperplasia of breast    Family history of cervical dysplasia    Infiltrating lobular carcinoma of breast in female [facility])    Menopause    Obesity    Psoriasis    STEPHANIE (obstructive sleep apnea)    Esophageal stricture    DCIS (ductal carcinoma in situ)     2/2015 RIGHT lumpectomy for 6 mm DCIS grade 2, %. %  Tamoxifen      Pre-diabetes    Vitamin D deficiency    GERD (gastroesophageal reflux disease)    Depressive disorder, not elsewhere classified    Insomnia    Fibromyalgia    HTN (hypertension)    MVP (mitral valve prolapse)    IBS (irritable bowel syndrome)       Review of Systems - negative except as listed above in the HPI    Objective:     Vitals:    11/15/22 0914   BP: 129/68   Pulse: 64   Resp: 14   Temp: 98.4 °F (36.9 °C)   TempSrc: Oral   SpO2: 99%   Weight: 137 lb (62.1 kg)   Height: 5' 1\" (1.549 m)         Assessment/ Plan:   Diagnoses and all orders for this visit:    1. Primary hypertension  -     LIPID PANEL; Future  -     METABOLIC PANEL, COMPREHENSIVE; Future  -     HEMOGLOBIN A1C WITH EAG; Future  -at goal    2. Pre-diabetes  -     METABOLIC PANEL, COMPREHENSIVE; Future  -     HEMOGLOBIN A1C WITH EAG;  Future         Follow-up and Dispositions    Return in about 1 year (around 11/15/2023). I have discussed the diagnosis with the patient and the intended plan as seen in the above orders. The patient understands and agrees with the plan. The patient has received an after-visit summary and questions were answered concerning future plans. Medication Side Effects and Warnings were discussed with patient  Patient Labs were reviewed and or requested:  Patient Past Records were reviewed and or requested    Morales Nagy M.D. There are no Patient Instructions on file for this visit.

## 2022-11-15 NOTE — PROGRESS NOTES
Chief Complaint   Patient presents with    Hypertension    Labs     Fasting today     1. Have you been to the ER, urgent care clinic since your last visit? Hospitalized since your last visit? Yes Where: [facility] UTI    2. Have you seen or consulted any other health care providers outside of the [address] [place] [address] since your last visit? Include any pap smears or colon screening.  No

## 2022-11-16 LAB
ALBUMIN SERPL-MCNC: 4.6 G/DL (ref 3.8–4.8)
ALBUMIN/GLOB SERPL: 2 {RATIO} (ref 1.2–2.2)
ALP SERPL-CCNC: 56 IU/L (ref 44–121)
ALT SERPL-CCNC: 20 IU/L (ref 0–32)
AST SERPL-CCNC: 20 IU/L (ref 0–40)
BILIRUB SERPL-MCNC: 0.3 MG/DL (ref 0–1.2)
BUN SERPL-MCNC: 20 MG/DL (ref 8–27)
BUN/CREAT SERPL: 32 (ref 12–28)
CALCIUM SERPL-MCNC: 9.5 MG/DL (ref 8.7–10.3)
CHLORIDE SERPL-SCNC: 106 MMOL/L (ref 96–106)
CHOLEST SERPL-MCNC: 170 MG/DL (ref 100–199)
CO2 SERPL-SCNC: 24 MMOL/L (ref 20–29)
CREAT SERPL-MCNC: 0.62 MG/DL (ref 0.57–1)
EGFR: 101 ML/MIN/1.73
EST. AVERAGE GLUCOSE BLD GHB EST-MCNC: 111 MG/DL
GLOBULIN SER CALC-MCNC: 2.3 G/DL (ref 1.5–4.5)
GLUCOSE SERPL-MCNC: 92 MG/DL (ref 70–99)
HBA1C MFR BLD: 5.5 % (ref 4.8–5.6)
HDLC SERPL-MCNC: 61 MG/DL
IMP & REVIEW OF LAB RESULTS: NORMAL
LDLC SERPL CALC-MCNC: 95 MG/DL (ref 0–99)
POTASSIUM SERPL-SCNC: 4.4 MMOL/L (ref 3.5–5.2)
PROT SERPL-MCNC: 6.9 G/DL (ref 6–8.5)
SODIUM SERPL-SCNC: 146 MMOL/L (ref 134–144)
TRIGL SERPL-MCNC: 76 MG/DL (ref 0–149)
VLDLC SERPL CALC-MCNC: 14 MG/DL (ref 5–40)

## 2022-11-21 NOTE — PROGRESS NOTES
Please contact patient regarding their mychart resulted labs. They have not reviewed them to date.       Dr. Abimbola Sears

## 2023-07-11 ENCOUNTER — OFFICE VISIT (OUTPATIENT)
Age: 63
End: 2023-07-11
Payer: COMMERCIAL

## 2023-07-11 VITALS
TEMPERATURE: 99 F | BODY MASS INDEX: 25.94 KG/M2 | HEIGHT: 61 IN | WEIGHT: 137.4 LBS | SYSTOLIC BLOOD PRESSURE: 135 MMHG | HEART RATE: 74 BPM | DIASTOLIC BLOOD PRESSURE: 77 MMHG | OXYGEN SATURATION: 99 %

## 2023-07-11 DIAGNOSIS — C50.919 INFILTRATING LOBULAR CARCINOMA OF BREAST IN FEMALE (HCC): ICD-10-CM

## 2023-07-11 DIAGNOSIS — R30.0 BURNING WITH URINATION: ICD-10-CM

## 2023-07-11 DIAGNOSIS — R35.0 URINARY FREQUENCY: ICD-10-CM

## 2023-07-11 DIAGNOSIS — N30.01 ACUTE CYSTITIS WITH HEMATURIA: Primary | ICD-10-CM

## 2023-07-11 LAB
BILIRUBIN, URINE, POC: NEGATIVE
BLOOD URINE, POC: NORMAL
GLUCOSE URINE, POC: NEGATIVE
KETONES, URINE, POC: NEGATIVE
LEUKOCYTE ESTERASE, URINE, POC: NORMAL
NITRITE, URINE, POC: NEGATIVE
PH, URINE, POC: 6 (ref 4.6–8)
PROTEIN,URINE, POC: NEGATIVE
SPECIFIC GRAVITY, URINE, POC: 1.01 (ref 1–1.03)
URINALYSIS CLARITY, POC: NORMAL
URINALYSIS COLOR, POC: YELLOW
UROBILINOGEN, POC: NORMAL

## 2023-07-11 PROCEDURE — 81002 URINALYSIS NONAUTO W/O SCOPE: CPT | Performed by: PHYSICIAN ASSISTANT

## 2023-07-11 PROCEDURE — 3075F SYST BP GE 130 - 139MM HG: CPT | Performed by: PHYSICIAN ASSISTANT

## 2023-07-11 PROCEDURE — 99213 OFFICE O/P EST LOW 20 MIN: CPT | Performed by: PHYSICIAN ASSISTANT

## 2023-07-11 PROCEDURE — 3078F DIAST BP <80 MM HG: CPT | Performed by: PHYSICIAN ASSISTANT

## 2023-07-11 RX ORDER — SULFAMETHOXAZOLE AND TRIMETHOPRIM 800; 160 MG/1; MG/1
1 TABLET ORAL 2 TIMES DAILY
Qty: 10 TABLET | Refills: 0 | Status: SHIPPED | OUTPATIENT
Start: 2023-07-11 | End: 2023-07-16

## 2023-07-14 LAB — BACTERIA UR CULT: ABNORMAL

## 2023-08-23 ENCOUNTER — TELEPHONE (OUTPATIENT)
Age: 63
End: 2023-08-23

## 2023-08-23 DIAGNOSIS — L40.9 PSORIASIS: ICD-10-CM

## 2023-08-23 DIAGNOSIS — I10 PRIMARY HYPERTENSION: Primary | ICD-10-CM

## 2023-08-23 RX ORDER — VALSARTAN AND HYDROCHLOROTHIAZIDE 80; 12.5 MG/1; MG/1
1 TABLET, FILM COATED ORAL DAILY
Qty: 90 TABLET | Refills: 0 | Status: SHIPPED | OUTPATIENT
Start: 2023-08-23

## 2023-08-23 RX ORDER — CLOBETASOL PROPIONATE 0.46 MG/ML
SOLUTION TOPICAL
Qty: 50 ML | Refills: 3 | Status: SHIPPED | OUTPATIENT
Start: 2023-08-23

## 2023-08-23 NOTE — TELEPHONE ENCOUNTER
[facility] needs a refill of valsartan-hydroCHLOROthiazide (DIOVAN-HCT) 80-12.5 MG per tablet. They have 15 pills/supply left and are requesting a 90 day supply with refills. Pharmacy has been updated in the chart. Patient was advised or scheduled an appointment for the future and to request refills thru the [employer] [facility] or by requesting a refill from their pharmacy in the future. Patient was also advised to check with their pharmacy for status of when refills are available. [facility] needs a refill of clobetasol (TEMOVATE) 0.05 % external solution. They have ? pills/supply left and are requesting a ? day supply with refills. Pharmacy has been updated in the chart. Patient was advised or scheduled an appointment for the future and to request refills thru the [employer] [facility] [employer] [facility] or by requesting a refill from their pharmacy in the future. Patient was also advised to check with their pharmacy for status of when refills are available.

## 2023-11-09 ENCOUNTER — TELEPHONE (OUTPATIENT)
Age: 63
End: 2023-11-09

## 2023-11-09 NOTE — TELEPHONE ENCOUNTER
Pt is positive for covid she started getting sick Tues with a fever of 101. fever has been in the 99. range W/cough and headaches what can she take

## 2023-11-09 NOTE — TELEPHONE ENCOUNTER
Spoke to patient , and  on speaker. Patient has a cough, low grade fever, congestion. Suggested mucinex max, otc, ibuprofen for headache and body aches. She has been taking dayquill and nightquill. Instructed to let Dr. Styles know if shortness of breath develop, we can get her an inhaler. Patient denies shortness of breath at this time. Instructed if sx become severe, go to ED.  verbalizes understanding.

## 2023-11-18 SDOH — ECONOMIC STABILITY: FOOD INSECURITY: WITHIN THE PAST 12 MONTHS, YOU WORRIED THAT YOUR FOOD WOULD RUN OUT BEFORE YOU GOT MONEY TO BUY MORE.: NEVER TRUE

## 2023-11-18 SDOH — ECONOMIC STABILITY: FOOD INSECURITY: WITHIN THE PAST 12 MONTHS, THE FOOD YOU BOUGHT JUST DIDN'T LAST AND YOU DIDN'T HAVE MONEY TO GET MORE.: NEVER TRUE

## 2023-11-18 SDOH — ECONOMIC STABILITY: INCOME INSECURITY: HOW HARD IS IT FOR YOU TO PAY FOR THE VERY BASICS LIKE FOOD, HOUSING, MEDICAL CARE, AND HEATING?: NOT VERY HARD

## 2023-11-18 SDOH — ECONOMIC STABILITY: TRANSPORTATION INSECURITY
IN THE PAST 12 MONTHS, HAS LACK OF TRANSPORTATION KEPT YOU FROM MEETINGS, WORK, OR FROM GETTING THINGS NEEDED FOR DAILY LIVING?: NO

## 2023-11-18 SDOH — ECONOMIC STABILITY: HOUSING INSECURITY
IN THE LAST 12 MONTHS, WAS THERE A TIME WHEN YOU DID NOT HAVE A STEADY PLACE TO SLEEP OR SLEPT IN A SHELTER (INCLUDING NOW)?: NO

## 2023-11-21 ENCOUNTER — OFFICE VISIT (OUTPATIENT)
Age: 63
End: 2023-11-21
Payer: COMMERCIAL

## 2023-11-21 VITALS
HEIGHT: 61 IN | OXYGEN SATURATION: 98 % | HEART RATE: 86 BPM | WEIGHT: 139 LBS | SYSTOLIC BLOOD PRESSURE: 171 MMHG | BODY MASS INDEX: 26.24 KG/M2 | RESPIRATION RATE: 14 BRPM | DIASTOLIC BLOOD PRESSURE: 63 MMHG | TEMPERATURE: 98.2 F

## 2023-11-21 DIAGNOSIS — R73.03 PRE-DIABETES: ICD-10-CM

## 2023-11-21 DIAGNOSIS — Z00.00 ROUTINE GENERAL MEDICAL EXAMINATION AT A HEALTH CARE FACILITY: Primary | ICD-10-CM

## 2023-11-21 DIAGNOSIS — E55.9 VITAMIN D DEFICIENCY: ICD-10-CM

## 2023-11-21 DIAGNOSIS — K21.9 GASTROESOPHAGEAL REFLUX DISEASE WITHOUT ESOPHAGITIS: ICD-10-CM

## 2023-11-21 DIAGNOSIS — R03.0 ELEVATED BLOOD PRESSURE READING: ICD-10-CM

## 2023-11-21 DIAGNOSIS — I10 PRIMARY HYPERTENSION: ICD-10-CM

## 2023-11-21 DIAGNOSIS — K22.2 ESOPHAGEAL STRICTURE: ICD-10-CM

## 2023-11-21 PROCEDURE — 3078F DIAST BP <80 MM HG: CPT | Performed by: FAMILY MEDICINE

## 2023-11-21 PROCEDURE — 99396 PREV VISIT EST AGE 40-64: CPT | Performed by: FAMILY MEDICINE

## 2023-11-21 PROCEDURE — 3077F SYST BP >= 140 MM HG: CPT | Performed by: FAMILY MEDICINE

## 2023-11-21 RX ORDER — TRIAMCINOLONE ACETONIDE 0.25 MG/G
CREAM TOPICAL
Qty: 454 G | Refills: 3 | Status: SHIPPED | OUTPATIENT
Start: 2023-11-21

## 2023-11-21 RX ORDER — VALSARTAN AND HYDROCHLOROTHIAZIDE 80; 12.5 MG/1; MG/1
1 TABLET, FILM COATED ORAL DAILY
Qty: 90 TABLET | Refills: 3 | Status: SHIPPED | OUTPATIENT
Start: 2023-11-21

## 2023-11-21 RX ORDER — AZITHROMYCIN 250 MG/1
TABLET, FILM COATED ORAL
Qty: 6 TABLET | Refills: 0 | Status: SHIPPED | OUTPATIENT
Start: 2023-11-21

## 2023-11-21 RX ORDER — CLOBETASOL PROPIONATE 0.5 MG/G
CREAM TOPICAL 2 TIMES DAILY
COMMUNITY
End: 2023-11-21 | Stop reason: SDUPTHER

## 2023-11-21 RX ORDER — TRIAMCINOLONE ACETONIDE 0.25 MG/G
OINTMENT TOPICAL 2 TIMES DAILY
Status: CANCELLED | OUTPATIENT
Start: 2023-11-21

## 2023-11-21 RX ORDER — ESOMEPRAZOLE MAGNESIUM 20 MG/1
20 GRANULE, DELAYED RELEASE ORAL DAILY
COMMUNITY

## 2023-11-21 RX ORDER — CLOBETASOL PROPIONATE 0.5 MG/G
CREAM TOPICAL 2 TIMES DAILY
Qty: 180 G | Refills: 3 | Status: SHIPPED | OUTPATIENT
Start: 2023-11-21

## 2023-11-21 RX ORDER — TRIAMCINOLONE ACETONIDE 0.25 MG/G
OINTMENT TOPICAL 2 TIMES DAILY
COMMUNITY

## 2023-11-21 ASSESSMENT — PATIENT HEALTH QUESTIONNAIRE - PHQ9
SUM OF ALL RESPONSES TO PHQ9 QUESTIONS 1 & 2: 0
1. LITTLE INTEREST OR PLEASURE IN DOING THINGS: 0
SUM OF ALL RESPONSES TO PHQ QUESTIONS 1-9: 0
2. FEELING DOWN, DEPRESSED OR HOPELESS: 0
SUM OF ALL RESPONSES TO PHQ QUESTIONS 1-9: 0

## 2023-11-21 NOTE — PROGRESS NOTES
Chief Complaint   Patient presents with    Annual Exam    Hypertension    Lab Collection     Fasting today    Medication Refill    Fibromyalgia     1. Have you been to the ER, urgent care clinic since your last visit? Hospitalized since your last visit? No    2. Have you seen or consulted any other health care providers outside of the [address] [place] [address] since your last visit? Include any pap smears or colon screening. No      Chief Complaint   Patient presents with    Annual Exam    Hypertension    Lab Collection     Fasting today    Medication Refill    Fibromyalgia     she is a 59y.o. year old female who presents for evalution. Reviewed PmHx, RxHx, FmHx, SocHx, AllgHx and updated and dated in the chart. CURRENT MEDS W/ ASSOC DIAG           Start Date End Date     azithromycin (ZITHROMAX) 250 MG tablet  11/21/23  --     2 tablets on day 1 followed by 1 tablet on days 2 - 5     Associated Diagnoses:  --     cetirizine (ZYRTEC) 10 MG tablet  07/25/16  --     Associated Diagnoses:  --     Cholecalciferol 50 MCG (2000 UT) TABS  --  --     Associated Diagnoses:  --     clobetasol (TEMOVATE) 0.05 % cream  11/21/23  --     Apply topically 2 times daily Apply topically 2 times daily.      Associated Diagnoses:  --     clobetasol (TEMOVATE) 0.05 % external solution  08/23/23  --     APPLY TO AFFECTED AREA TWICE A DAY FOR 30 DAYS DISCARD REMAINDER     Associated Diagnoses:  Psoriasis     desonide (DESOWEN) 0.05 % ointment  --  --     Associated Diagnoses:  --     esomeprazole Magnesium (NEXIUM) 20 MG PACK  --  --     Associated Diagnoses:  --     metaxalone (SKELAXIN) 800 MG tablet  11/15/21  --     Associated Diagnoses:  --     metroNIDAZOLE (METROCREAM) 0.75 % cream  12/03/20  --     Associated Diagnoses:  --     Multiple Vitamins-Minerals (MULTIVITAMIN WOMEN 50+ PO)  --  --     Associated Diagnoses:  --     mupirocin (BACTROBAN) 2 % ointment  04/12/18  --     Associated Diagnoses:  --     Salicylic Acid 6 %

## 2023-11-21 NOTE — PROGRESS NOTES
Chief Complaint   Patient presents with    Annual Exam    Hypertension    Lab Collection     Fasting today    Medication Refill    Fibromyalgia     1. Have you been to the ER, urgent care clinic since your last visit? Hospitalized since your last visit? No    2. Have you seen or consulted any other health care providers outside of the [address] [place] [address] Avenue since your last visit? Include any pap smears or colon screening.  No

## 2023-11-22 LAB
25(OH)D3+25(OH)D2 SERPL-MCNC: 39.2 NG/ML (ref 30–100)
ALBUMIN SERPL-MCNC: 4.6 G/DL (ref 3.9–4.9)
ALBUMIN/GLOB SERPL: 1.8 {RATIO} (ref 1.2–2.2)
ALP SERPL-CCNC: 65 IU/L (ref 44–121)
ALT SERPL-CCNC: 41 IU/L (ref 0–32)
AST SERPL-CCNC: 31 IU/L (ref 0–40)
BASOPHILS # BLD AUTO: 0 X10E3/UL (ref 0–0.2)
BASOPHILS NFR BLD AUTO: 0 %
BILIRUB SERPL-MCNC: 0.3 MG/DL (ref 0–1.2)
BUN SERPL-MCNC: 14 MG/DL (ref 8–27)
BUN/CREAT SERPL: 25 (ref 12–28)
CALCIUM SERPL-MCNC: 9.4 MG/DL (ref 8.7–10.3)
CHLORIDE SERPL-SCNC: 103 MMOL/L (ref 96–106)
CHOLEST SERPL-MCNC: 178 MG/DL (ref 100–199)
CO2 SERPL-SCNC: 25 MMOL/L (ref 20–29)
CREAT SERPL-MCNC: 0.56 MG/DL (ref 0.57–1)
EGFRCR SERPLBLD CKD-EPI 2021: 103 ML/MIN/1.73
EOSINOPHIL # BLD AUTO: 0 X10E3/UL (ref 0–0.4)
EOSINOPHIL NFR BLD AUTO: 1 %
ERYTHROCYTE [DISTWIDTH] IN BLOOD BY AUTOMATED COUNT: 12.6 % (ref 11.7–15.4)
GLOBULIN SER CALC-MCNC: 2.6 G/DL (ref 1.5–4.5)
GLUCOSE SERPL-MCNC: 95 MG/DL (ref 70–99)
HBA1C MFR BLD: 5.8 % (ref 4.8–5.6)
HCT VFR BLD AUTO: 40.5 % (ref 34–46.6)
HDLC SERPL-MCNC: 64 MG/DL
HGB BLD-MCNC: 12.7 G/DL (ref 11.1–15.9)
IMM GRANULOCYTES # BLD AUTO: 0 X10E3/UL (ref 0–0.1)
IMM GRANULOCYTES NFR BLD AUTO: 0 %
IMP & REVIEW OF LAB RESULTS: NORMAL
LDLC SERPL CALC-MCNC: 96 MG/DL (ref 0–99)
LYMPHOCYTES # BLD AUTO: 1.2 X10E3/UL (ref 0.7–3.1)
LYMPHOCYTES NFR BLD AUTO: 23 %
MCH RBC QN AUTO: 28.9 PG (ref 26.6–33)
MCHC RBC AUTO-ENTMCNC: 31.4 G/DL (ref 31.5–35.7)
MCV RBC AUTO: 92 FL (ref 79–97)
MONOCYTES # BLD AUTO: 0.5 X10E3/UL (ref 0.1–0.9)
MONOCYTES NFR BLD AUTO: 10 %
NEUTROPHILS # BLD AUTO: 3.5 X10E3/UL (ref 1.4–7)
NEUTROPHILS NFR BLD AUTO: 66 %
PLATELET # BLD AUTO: 240 X10E3/UL (ref 150–450)
POTASSIUM SERPL-SCNC: 4.5 MMOL/L (ref 3.5–5.2)
PROT SERPL-MCNC: 7.2 G/DL (ref 6–8.5)
RBC # BLD AUTO: 4.39 X10E6/UL (ref 3.77–5.28)
SODIUM SERPL-SCNC: 142 MMOL/L (ref 134–144)
TRIGL SERPL-MCNC: 98 MG/DL (ref 0–149)
TSH SERPL DL<=0.005 MIU/L-ACNC: 0.83 UIU/ML (ref 0.45–4.5)
VLDLC SERPL CALC-MCNC: 18 MG/DL (ref 5–40)
WBC # BLD AUTO: 5.3 X10E3/UL (ref 3.4–10.8)

## 2024-01-11 ENCOUNTER — TELEPHONE (OUTPATIENT)
Age: 64
End: 2024-01-11

## 2024-01-11 DIAGNOSIS — Z87.42 HISTORY OF ATYPICAL HYPERPLASIA OF BREAST: ICD-10-CM

## 2024-01-11 DIAGNOSIS — N64.4 BREAST PAIN, LEFT: Primary | ICD-10-CM

## 2024-01-11 NOTE — TELEPHONE ENCOUNTER
Pt stated \"I am having pain in my left breast on the side where I had surgery. It hurts to touch.\" Callback requested.

## 2024-01-11 NOTE — TELEPHONE ENCOUNTER
Spoke with the patient and she states she has had some left breast pain since the end of November/beginning of December.  She states a history of breast surgery that she believes was in 2015 for atypical hyperplasia of her breast.  She denies any caffeine intake.  Per Dr Overton, order for diagnostic bilateral mammogram and left breast ultrasound entered and the patient will contact scheduling to set her own appointment.

## 2024-01-22 ENCOUNTER — HOSPITAL ENCOUNTER (OUTPATIENT)
Facility: HOSPITAL | Age: 64
Discharge: HOME | End: 2024-01-25
Attending: OBSTETRICS & GYNECOLOGY
Payer: COMMERCIAL

## 2024-01-22 ENCOUNTER — HOSPITAL ENCOUNTER (OUTPATIENT)
Facility: HOSPITAL | Age: 64
End: 2024-01-22
Attending: OBSTETRICS & GYNECOLOGY
Payer: COMMERCIAL

## 2024-01-22 DIAGNOSIS — Z87.42 HISTORY OF ATYPICAL HYPERPLASIA OF BREAST: ICD-10-CM

## 2024-01-22 DIAGNOSIS — N64.4 BREAST PAIN, LEFT: ICD-10-CM

## 2024-01-22 PROCEDURE — 76642 ULTRASOUND BREAST LIMITED: CPT

## 2024-01-22 PROCEDURE — G0279 TOMOSYNTHESIS, MAMMO: HCPCS

## 2024-01-30 ENCOUNTER — OFFICE VISIT (OUTPATIENT)
Age: 64
End: 2024-01-30
Payer: COMMERCIAL

## 2024-01-30 ENCOUNTER — TELEPHONE (OUTPATIENT)
Age: 64
End: 2024-01-30

## 2024-01-30 VITALS
HEIGHT: 61 IN | DIASTOLIC BLOOD PRESSURE: 74 MMHG | WEIGHT: 137.13 LBS | SYSTOLIC BLOOD PRESSURE: 128 MMHG | BODY MASS INDEX: 25.89 KG/M2

## 2024-01-30 DIAGNOSIS — Z01.419 GYNECOLOGIC EXAM NORMAL: ICD-10-CM

## 2024-01-30 DIAGNOSIS — N90.89 SKIN TAG OF LABIA: ICD-10-CM

## 2024-01-30 DIAGNOSIS — N95.9 MENOPAUSAL PROBLEM: Primary | ICD-10-CM

## 2024-01-30 DIAGNOSIS — Z12.72 ENCOUNTER FOR PAPANICOLAOU SMEAR OF VAGINA: ICD-10-CM

## 2024-01-30 PROCEDURE — 3078F DIAST BP <80 MM HG: CPT | Performed by: OBSTETRICS & GYNECOLOGY

## 2024-01-30 PROCEDURE — 99396 PREV VISIT EST AGE 40-64: CPT | Performed by: OBSTETRICS & GYNECOLOGY

## 2024-01-30 PROCEDURE — 3074F SYST BP LT 130 MM HG: CPT | Performed by: OBSTETRICS & GYNECOLOGY

## 2024-01-30 ASSESSMENT — ENCOUNTER SYMPTOMS
GASTROINTESTINAL NEGATIVE: 1
RESPIRATORY NEGATIVE: 1

## 2024-01-30 NOTE — TELEPHONE ENCOUNTER
PT called in stating that her dermatologist would like to know if patient can take either tremfya or cosentyx, due to PT's medical history as well as her having taken tamoxifen in the past.     CB# [phone]

## 2024-01-30 NOTE — PROGRESS NOTES
Nenita Mooney is a , 63 y.o. female   No LMP recorded. Patient has had a hysterectomy.    She presents for her annual    She is having no significant problems.      Menstrual status:  Cycles are hysterectomy.    Flow: absent.      She does not have dysmenorrhea.      Medical conditions:  Since her last annual GYN exam about one year ago, she has not the following changes in her health history: none.     Mammogram History:    SIMON Results (most recent):  @BSdinCloudSTIMGCAT(USF7217:1)@     DEXA Results (most recent):  @BSHSILASTIMGCAT(QWT7476:1)@       Past Medical History:   Diagnosis Date    Arthritis     Breast atypical hyperplasia 2010    LEFT atypical lobular hyperplasia 2009    Chronic pain     fibromyalgia widespread pain    DM (diabetes mellitus) (HCC) 2010    pre-diabetes no meds    Fibromyalgia 2010    GERD (gastroesophageal reflux disease)     HTN (hypertension) 2010    IBS (irritable bowel syndrome) 2010    Ill-defined condition     esophageal stricture  esophageal diliation 2014    MVP (mitral valve prolapse) 2010    Retinal detachment     Left eye     Past Surgical History:   Procedure Laterality Date    BREAST BIOPSY Right 2015    RIGHT BREAST EXCISIONAL BIOPSY WITH RIGHT NEEDLE LOCALIZATION performed by Milena Dai MD at [facility] MAIN OR    BREAST LUMPECTOMY Right     BREAST SURGERY  2009    Biopsy left    CARPAL TUNNEL RELEASE      CHOLECYSTECTOMY      CHOLECYSTECTOMY      COLONOSCOPY  10/01/2015    GYN      hyst    HYSTERECTOMY (CERVIX STATUS UNKNOWN)      ORTHOPEDIC SURGERY  ,2006    carpal tunnel bilateral    RETINAL DETACHMENT SURGERY         Prior to Admission medications    Medication Sig Start Date End Date Taking? Authorizing Provider   triamcinolone (KENALOG) 0.025 % ointment Apply topically 2 times daily Apply topically 2 times daily.   Yes Provider, MD Salvatore   clobetasol (TEMOVATE) 0.05 % cream Apply topically 2 times daily

## 2024-01-30 NOTE — PROGRESS NOTES
Chief Complaint   Patient presents with    Annual Exam     Mammo/US 1-22-24  [id number]     /74 (Site: Left Upper Arm, Position: Sitting, Cuff Size: Small Adult)   Ht 1.549 m (5' 1\")   Wt 62.2 kg (137 lb 2 oz)   BMI 25.91 kg/m²

## 2024-01-31 NOTE — TELEPHONE ENCOUNTER
[facility] [place] [facility]  [phone]    01/31/24 12:41 PM EST - Called patient back after speaking with the team and checking her medical records. Informed patient that from a strictly breast cancer standpoint, after checking her medical history from 2020, there was no contraindication for those medications. Patient had no further questions.

## 2024-02-01 LAB
., LABCORP: NORMAL
CYTOLOGIST CVX/VAG CYTO: NORMAL
CYTOLOGY CVX/VAG DOC CYTO: NORMAL
CYTOLOGY CVX/VAG DOC THIN PREP: NORMAL
DX ICD CODE: NORMAL
Lab: NORMAL
OTHER STN SPEC: NORMAL
STAT OF ADQ CVX/VAG CYTO-IMP: NORMAL

## 2024-02-06 ENCOUNTER — PROCEDURE VISIT (OUTPATIENT)
Age: 64
End: 2024-02-06
Payer: COMMERCIAL

## 2024-02-06 VITALS — BODY MASS INDEX: 25.89 KG/M2 | HEIGHT: 61 IN | WEIGHT: 137.13 LBS

## 2024-02-06 DIAGNOSIS — N90.89 SKIN TAG OF LABIA: Primary | ICD-10-CM

## 2024-02-06 PROCEDURE — 56605 BIOPSY OF VULVA/PERINEUM: CPT | Performed by: OBSTETRICS & GYNECOLOGY

## 2024-02-06 ASSESSMENT — ENCOUNTER SYMPTOMS
GASTROINTESTINAL NEGATIVE: 1
RESPIRATORY NEGATIVE: 1

## 2024-02-06 NOTE — PROGRESS NOTES
Chief Complaint   Patient presents with    Other     Skin tag removal     Ht 1.549 m (5' 1\")   Wt 62.2 kg (137 lb 2 oz)   BMI 25.91 kg/m²

## 2024-02-06 NOTE — PROGRESS NOTES
Nenita Mooney is a , 63 y.o. female   No LMP recorded. Patient has had a hysterectomy.    She presents for her problem    She is having  a skin lesion on the right labia, area presented fairly quickly per pt .      Menstrual status:  Cycles are hysterectomy.    Flow: absent.      She does not have dysmenorrhea.      Medical conditions:  Since her last annual GYN exam about one year ago, she has not the following changes in her health history: none.     Mammogram History:    SIMON Results (most recent):  @[employer]([id number]:1)@     DEXA Results (most recent):  @[employer]([id number]:1)@       Past Medical History:   Diagnosis Date    Arthritis     Breast atypical hyperplasia 2010    LEFT atypical lobular hyperplasia     Chronic pain     fibromyalgia widespread pain    DM (diabetes mellitus) (HCC) 2010    pre-diabetes no meds    Fibromyalgia 2010    GERD (gastroesophageal reflux disease)     HTN (hypertension) 2010    IBS (irritable bowel syndrome) 2010    Ill-defined condition     esophageal stricture  esophageal diliation 2014    MVP (mitral valve prolapse) 2010    Retinal detachment     Left eye     Past Surgical History:   Procedure Laterality Date    BREAST BIOPSY Right 2015    RIGHT BREAST EXCISIONAL BIOPSY WITH RIGHT NEEDLE LOCALIZATION performed by Milena Dai MD at [facility] MAIN OR    BREAST LUMPECTOMY Right     BREAST SURGERY  2009    Biopsy left    CARPAL TUNNEL RELEASE      CHOLECYSTECTOMY      CHOLECYSTECTOMY  's    COLONOSCOPY  10/01/2015    GYN      hyst    HYSTERECTOMY (CERVIX STATUS UNKNOWN)      ORTHOPEDIC SURGERY  ,2006    carpal tunnel bilateral    RETINAL DETACHMENT SURGERY      SKIN TAG REMOVAL N/A 2024       Prior to Admission medications    Medication Sig Start Date End Date Taking? Authorizing Provider   triamcinolone (KENALOG) 0.025 % ointment Apply topically 2 times daily Apply topically 2 times daily.   Yes Provider,

## 2024-02-09 LAB
ANNOTATION COMMENT IMP: NORMAL
CPT BILLING CODE: NORMAL
DIAGNOSIS SYNOPSIS:: NORMAL
DX ICD CODE: NORMAL
DX ICD CODE: NORMAL
PATH REPORT.COMMENTS IMP SPEC: NORMAL
PATH REPORT.FINAL DX SPEC: NORMAL
PATH REPORT.GROSS SPEC: NORMAL
PATH REPORT.SITE OF ORIGIN SPEC: NORMAL
PATHOLOGIST NAME: NORMAL
PAYMENT PROCEDURE: NORMAL

## 2024-02-12 ENCOUNTER — TELEPHONE (OUTPATIENT)
Age: 64
End: 2024-02-12

## 2024-02-12 DIAGNOSIS — N95.9 POST MENOPAUSAL PROBLEMS: Primary | ICD-10-CM

## 2024-02-12 NOTE — TELEPHONE ENCOUNTER
Maria Del Carmen from [facility] calling and stated that she need an order for a Dexa Bone Density faxed over for this pt whose appt is at 11:40  am; fax number [phone]

## 2024-03-21 ENCOUNTER — PATIENT MESSAGE (OUTPATIENT)
Age: 64
End: 2024-03-21

## 2024-03-21 RX ORDER — FLUTICASONE PROPIONATE 50 MCG
2 SPRAY, SUSPENSION (ML) NASAL DAILY
Qty: 16 G | Refills: 5 | Status: SHIPPED | OUTPATIENT
Start: 2024-03-21

## 2024-03-21 NOTE — TELEPHONE ENCOUNTER
Anjana Hicks LPN 3/21/2024 7:59 AM EDT      ----- Message -----  From: Nenita Mooney  Sent: 3/21/2024 7:59 AM EDT  To: [facility] Clinical Staff  Subject: Sinus medication Compatible with Cosentyx     Dr. Styles,  I started Cosentyx 1 month ago. My allergy symptoms started on Monday. It feels like it has settled in my sinuses. No fever, but lots of drainage, sore throat and pressure in my head. I am on Zyrtec. What can I take over the counter to help with these other systems, so it does not go into a sinus infection?    Regards,  Daisy

## 2024-04-19 ENCOUNTER — HOSPITAL ENCOUNTER (EMERGENCY)
Facility: HOSPITAL | Age: 64
Discharge: HOME | End: 2024-04-19
Attending: EMERGENCY MEDICINE
Payer: COMMERCIAL

## 2024-04-19 ENCOUNTER — APPOINTMENT (OUTPATIENT)
Facility: HOSPITAL | Age: 64
End: 2024-04-19
Payer: COMMERCIAL

## 2024-04-19 VITALS
RESPIRATION RATE: 17 BRPM | HEIGHT: 61 IN | BODY MASS INDEX: 25.86 KG/M2 | OXYGEN SATURATION: 98 % | WEIGHT: 137 LBS | DIASTOLIC BLOOD PRESSURE: 78 MMHG | SYSTOLIC BLOOD PRESSURE: 130 MMHG | TEMPERATURE: 98.3 F | HEART RATE: 68 BPM

## 2024-04-19 DIAGNOSIS — M54.12 CERVICAL RADICULOPATHY: ICD-10-CM
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
## 2024-04-19 DIAGNOSIS — S13.4XXA WHIPLASH INJURY TO NECK, INITIAL ENCOUNTER: ICD-10-CM

## 2024-04-19 PROCEDURE — 99284 EMERGENCY DEPT VISIT MOD MDM: CPT

## 2024-04-19 PROCEDURE — 71250 CT THORAX DX C-: CPT

## 2024-04-19 PROCEDURE — 96374 THER/PROPH/DIAG INJ IV PUSH: CPT

## 2024-04-19 PROCEDURE — 72125 CT NECK SPINE W/O DYE: CPT

## 2024-04-19 PROCEDURE — 70450 CT HEAD/BRAIN W/O DYE: CPT

## 2024-04-19 PROCEDURE — 6370000000 HC RX 637 (ALT 250 FOR IP): Performed by: EMERGENCY MEDICINE

## 2024-04-19 PROCEDURE — 6360000002 HC RX W HCPCS: Performed by: EMERGENCY MEDICINE

## 2024-04-19 PROCEDURE — 72131 CT LUMBAR SPINE W/O DYE: CPT

## 2024-04-19 RX ORDER — ACETAMINOPHEN 325 MG/1
650 TABLET ORAL
Status: COMPLETED | OUTPATIENT
Start: 2024-04-19 | End: 2024-04-19

## 2024-04-19 RX ORDER — NAPROXEN 500 MG/1
500 TABLET ORAL 2 TIMES DAILY WITH MEALS
Qty: 20 TABLET | Refills: 0 | Status: SHIPPED | OUTPATIENT
Start: 2024-04-19

## 2024-04-19 RX ORDER — TRAMADOL HYDROCHLORIDE 50 MG/1
50 TABLET ORAL
Status: COMPLETED | OUTPATIENT
Start: 2024-04-19 | End: 2024-04-19

## 2024-04-19 RX ORDER — NAPROXEN 500 MG/1
500 TABLET ORAL
Status: COMPLETED | OUTPATIENT
Start: 2024-04-19 | End: 2024-04-19

## 2024-04-19 RX ORDER — KETOROLAC TROMETHAMINE 15 MG/ML
15 INJECTION, SOLUTION INTRAMUSCULAR; INTRAVENOUS
Status: COMPLETED | OUTPATIENT
Start: 2024-04-19 | End: 2024-04-19

## 2024-04-19 RX ADMIN — NAPROXEN 500 MG: 500 TABLET ORAL at 20:21

## 2024-04-19 RX ADMIN — TRAMADOL HYDROCHLORIDE 50 MG: 50 TABLET ORAL at 21:29

## 2024-04-19 RX ADMIN — KETOROLAC TROMETHAMINE 15 MG: 15 INJECTION, SOLUTION INTRAMUSCULAR; INTRAVENOUS at 20:22

## 2024-04-19 RX ADMIN — ACETAMINOPHEN 650 MG: 325 TABLET ORAL at 19:51

## 2024-04-19 ASSESSMENT — PAIN DESCRIPTION - FREQUENCY: FREQUENCY: CONTINUOUS

## 2024-04-19 ASSESSMENT — PAIN SCALES - GENERAL
PAINLEVEL_OUTOF10: 6
PAINLEVEL_OUTOF10: 7
PAINLEVEL_OUTOF10: 7
PAINLEVEL_OUTOF10: 8

## 2024-04-19 ASSESSMENT — PAIN - FUNCTIONAL ASSESSMENT
PAIN_FUNCTIONAL_ASSESSMENT: 0-10
PAIN_FUNCTIONAL_ASSESSMENT: ACTIVITIES ARE NOT PREVENTED
PAIN_FUNCTIONAL_ASSESSMENT: 0-10

## 2024-04-19 ASSESSMENT — PAIN DESCRIPTION - LOCATION
LOCATION: SHOULDER;NECK;BACK
LOCATION: BACK;SHOULDER;NECK
LOCATION: BACK;NECK;SHOULDER
LOCATION: SHOULDER;NECK;BACK

## 2024-04-19 ASSESSMENT — PAIN DESCRIPTION - DESCRIPTORS: DESCRIPTORS: ACHING;THROBBING

## 2024-04-19 ASSESSMENT — PAIN DESCRIPTION - PAIN TYPE: TYPE: ACUTE PAIN
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
## 2024-04-20 NOTE — DISCHARGE INSTRUCTIONS
Thank you!  Thank you for allowing me to care for you in the emergency department. It is my goal to provide you with excellent care.  Please fill out the survey that will come to you by mail or email since we listen to your feedback!     Below you will find a list of your tests from today's visit.  Should you have any questions, please do not hesitate to call the emergency department.    Labs  No results found for this or any previous visit (from the past 12 hour(s)).    Radiologic Studies  CT HEAD WO CONTRAST   Final Result   No acute intracranial abnormality.         CT CERVICAL SPINE WO CONTRAST   Final Result   No fracture.         CT LUMBAR SPINE WO CONTRAST   Final Result   No acute osseous abnormality or malalignment.   .            CT CHEST WO CONTRAST   Final Result   No acute finding        ------------------------------------------------------------------------------------------------------------  The exam and treatment you received in the Emergency Department were for an urgent problem and are not intended as complete care. It is important that you follow-up with a doctor, nurse practitioner, or physician assistant to:  (1) confirm your diagnosis,  (2) re-evaluation of changes in your illness and treatment, and (3) for ongoing care. Please take your discharge instructions with you when you go to your follow-up appointment.     If you have any problem arranging a follow-up appointment, contact the Emergency Department.  If your symptoms become worse or you do not improve as expected and you are unable to reach your health care provider, please return to the Emergency Department. We are available 24 hours a day.     If a prescription has been provided, please have it filled as soon as possible to prevent a delay in treatment. If you have any questions or reservations about taking the medication due to side effects or interactions with other medications, please call your primary care provider or

## 2024-04-22 LAB
EKG ATRIAL RATE: 78 BPM
EKG DIAGNOSIS: NORMAL
EKG P AXIS: 75 DEGREES
EKG P-R INTERVAL: 138 MS
EKG Q-T INTERVAL: 382 MS
EKG QRS DURATION: 104 MS
EKG QTC CALCULATION (BAZETT): 435 MS
EKG R AXIS: -11 DEGREES
EKG T AXIS: 47 DEGREES
EKG VENTRICULAR RATE: 78 BPM

## 2024-04-28 ENCOUNTER — PATIENT MESSAGE (OUTPATIENT)
Age: 64
End: 2024-04-28

## 2024-04-29 RX ORDER — PREDNISONE 10 MG/1
TABLET ORAL
Qty: 1 EACH | Refills: 0 | Status: SHIPPED | OUTPATIENT
Start: 2024-04-29

## 2024-04-29 NOTE — TELEPHONE ENCOUNTER
Jareth, Processor 4/29/2024 3:26 PM EDT    I am willing to try anything. Can I take the steroid along with the muscle relaxant and Tylenol or Ibuprofen? What specialist would you recommend me going to see?

## 2024-06-24 ENCOUNTER — CLINICAL DOCUMENTATION (OUTPATIENT)
Age: 64
End: 2024-06-24

## 2024-08-16 ENCOUNTER — TELEPHONE (OUTPATIENT)
Age: 64
End: 2024-08-16

## 2024-08-16 NOTE — TELEPHONE ENCOUNTER
----- Message from Mack JUANY sent at 8/16/2024 11:20 AM EDT -----  Regarding: [facility] Appointment Request  [facility] Appointment Request    Patient needs appointment for [facility] Appointment Type: Annual Visit\ Yearly visit    Patient Requested Dates(s): End of November   Patient Requested Time:between 8 or 9 o'clock  Provider Name: Lyle Stephani    Reason for Appointment Request: New Patient - No appointments available during search  --------------------------------------------------------------------------------------------------------------------------    Relationship to Patient: Self     Call Back Information: OK to leave message on voicemail  Preferred Call Back Number: Phone [phone] (home) [phone]

## 2024-09-09 DIAGNOSIS — I10 PRIMARY HYPERTENSION: ICD-10-CM

## 2024-09-10 RX ORDER — METAXALONE 800 MG/1
800 TABLET ORAL 3 TIMES DAILY
Qty: 180 TABLET | Refills: 1 | Status: SHIPPED | OUTPATIENT
Start: 2024-09-10

## 2024-09-10 RX ORDER — VALSARTAN AND HYDROCHLOROTHIAZIDE 80; 12.5 MG/1; MG/1
1 TABLET, FILM COATED ORAL DAILY
Qty: 90 TABLET | Refills: 3 | Status: SHIPPED | OUTPATIENT
Start: 2024-09-10

## 2024-11-25 ENCOUNTER — OFFICE VISIT (OUTPATIENT)
Facility: CLINIC | Age: 64
End: 2024-11-25
Payer: COMMERCIAL

## 2024-11-25 VITALS
BODY MASS INDEX: 25.49 KG/M2 | TEMPERATURE: 97.9 F | HEIGHT: 61 IN | DIASTOLIC BLOOD PRESSURE: 61 MMHG | WEIGHT: 135 LBS | SYSTOLIC BLOOD PRESSURE: 139 MMHG | HEART RATE: 82 BPM | OXYGEN SATURATION: 97 % | RESPIRATION RATE: 16 BRPM

## 2024-11-25 DIAGNOSIS — L65.9 HAIR LOSS: ICD-10-CM

## 2024-11-25 DIAGNOSIS — R73.03 PRE-DIABETES: ICD-10-CM

## 2024-11-25 DIAGNOSIS — I10 PRIMARY HYPERTENSION: ICD-10-CM

## 2024-11-25 DIAGNOSIS — Z00.00 ROUTINE GENERAL MEDICAL EXAMINATION AT A HEALTH CARE FACILITY: Primary | ICD-10-CM

## 2024-11-25 DIAGNOSIS — L40.9 PSORIASIS: ICD-10-CM

## 2024-11-25 DIAGNOSIS — E55.9 VITAMIN D DEFICIENCY: ICD-10-CM

## 2024-11-25 DIAGNOSIS — Z00.00 ROUTINE GENERAL MEDICAL EXAMINATION AT A HEALTH CARE FACILITY: ICD-10-CM

## 2024-11-25 PROCEDURE — 3078F DIAST BP <80 MM HG: CPT | Performed by: FAMILY MEDICINE

## 2024-11-25 PROCEDURE — 99396 PREV VISIT EST AGE 40-64: CPT | Performed by: FAMILY MEDICINE

## 2024-11-25 PROCEDURE — 3075F SYST BP GE 130 - 139MM HG: CPT | Performed by: FAMILY MEDICINE

## 2024-11-25 RX ORDER — SECUKINUMAB 150 MG/ML
150 INJECTION SUBCUTANEOUS
COMMUNITY

## 2024-11-25 RX ORDER — METAXALONE 800 MG/1
800 TABLET ORAL 3 TIMES DAILY
Qty: 180 TABLET | Refills: 1 | Status: SHIPPED | OUTPATIENT
Start: 2024-11-25

## 2024-11-25 RX ORDER — OMEPRAZOLE 40 MG/1
40 CAPSULE, DELAYED RELEASE ORAL 2 TIMES DAILY
COMMUNITY

## 2024-11-25 SDOH — ECONOMIC STABILITY: FOOD INSECURITY: WITHIN THE PAST 12 MONTHS, YOU WORRIED THAT YOUR FOOD WOULD RUN OUT BEFORE YOU GOT MONEY TO BUY MORE.: NEVER TRUE

## 2024-11-25 SDOH — ECONOMIC STABILITY: INCOME INSECURITY: HOW HARD IS IT FOR YOU TO PAY FOR THE VERY BASICS LIKE FOOD, HOUSING, MEDICAL CARE, AND HEATING?: NOT HARD AT ALL

## 2024-11-25 SDOH — ECONOMIC STABILITY: FOOD INSECURITY: WITHIN THE PAST 12 MONTHS, THE FOOD YOU BOUGHT JUST DIDN'T LAST AND YOU DIDN'T HAVE MONEY TO GET MORE.: NEVER TRUE

## 2024-11-25 ASSESSMENT — PATIENT HEALTH QUESTIONNAIRE - PHQ9
SUM OF ALL RESPONSES TO PHQ QUESTIONS 1-9: 0
SUM OF ALL RESPONSES TO PHQ QUESTIONS 1-9: 0
SUM OF ALL RESPONSES TO PHQ9 QUESTIONS 1 & 2: 0
2. FEELING DOWN, DEPRESSED OR HOPELESS: NOT AT ALL
SUM OF ALL RESPONSES TO PHQ QUESTIONS 1-9: 0
SUM OF ALL RESPONSES TO PHQ QUESTIONS 1-9: 0
1. LITTLE INTEREST OR PLEASURE IN DOING THINGS: NOT AT ALL

## 2024-11-25 NOTE — PROGRESS NOTES
Chief Complaint   Patient presents with    Annual Exam     Patient presents in office today for CPE and fasting labs.  No concerns.      \"Have you been to the ER, urgent care clinic since your last visit?  Hospitalized since your last visit?\"    NO    “Have you seen or consulted any other health care providers outside our system since your last visit?”    NO

## 2024-11-25 NOTE — PROGRESS NOTES
Chief Complaint   Patient presents with    Annual Exam     Patient presents in office today for CPE and fasting labs.  No concerns.      \"Have you been to the ER, urgent care clinic since your last visit?  Hospitalized since your last visit?\"    NO    “Have you seen or consulted any other health care providers outside our system since your last visit?”    NO                 Nenita Mooney (:  1960) is a 64 y.o. female, here for evaluation of the following chief complaint(s):  Annual Exam         Assessment & Plan  1. Hair loss.  The hair loss is likely due to psoriasis affecting the hair roots. Comprehensive blood work will be conducted to assess for potential diabetes, thyroid issues, and deficiencies in vitamin D and B12.     2. Psoriasis.  Psoriasis is present on the scalp, which is likely causing hair loss. Blood work will be conducted to rule out other potential causes such as low protein and B12 levels.    3. Gastroesophageal Reflux Disease (GERD).  She continues to experience stomach issues and difficulty with certain foods, particularly red meats. She is advised to try Lactaid pills before meals for a few weeks to see if it alleviates her symptoms.    4. Vitamin D deficiency.  She has a history of low vitamin D levels. Blood work will be conducted to check her current vitamin D levels.    5. Medication Management.  A prescription for Skelaxin will be refilled and sent to [facility].      Results    1. Routine general medical examination at a health care facility  -     metaxalone (SKELAXIN) 800 MG tablet; Take 1 tablet by mouth 3 times daily, Disp-180 tablet, R-1Normal  -     Hemoglobin A1C; Future  -     Comprehensive Metabolic Panel; Future  -     CBC with Auto Differential; Future  -     Lipid Panel; Future  -     Microalbumin / Creatinine Urine Ratio; Future  -     TSH; Future  -     Vitamin D 25 Hydroxy; Future  -     Vitamin B12; Future  2. Hair loss  -     CBC with Auto Differential;

## 2024-11-26 LAB
25(OH)D3 SERPL-MCNC: 33.9 NG/ML (ref 30–100)
ALBUMIN SERPL-MCNC: 4.2 G/DL (ref 3.5–5)
ALBUMIN/GLOB SERPL: 1.3 (ref 1.1–2.2)
ALP SERPL-CCNC: 59 U/L (ref 45–117)
ALT SERPL-CCNC: 34 U/L (ref 12–78)
ANION GAP SERPL CALC-SCNC: 5 MMOL/L (ref 2–12)
AST SERPL-CCNC: 23 U/L (ref 15–37)
BASOPHILS # BLD: 0 K/UL (ref 0–0.1)
BASOPHILS NFR BLD: 1 % (ref 0–1)
BILIRUB SERPL-MCNC: 0.5 MG/DL (ref 0.2–1)
BUN SERPL-MCNC: 16 MG/DL (ref 6–20)
BUN/CREAT SERPL: 28 (ref 12–20)
CALCIUM SERPL-MCNC: 9.5 MG/DL (ref 8.5–10.1)
CHLORIDE SERPL-SCNC: 107 MMOL/L (ref 97–108)
CHOLEST SERPL-MCNC: 187 MG/DL
CO2 SERPL-SCNC: 30 MMOL/L (ref 21–32)
CREAT SERPL-MCNC: 0.57 MG/DL (ref 0.55–1.02)
CREAT UR-MCNC: 263 MG/DL
DIFFERENTIAL METHOD BLD: NORMAL
EOSINOPHIL # BLD: 0.1 K/UL (ref 0–0.4)
EOSINOPHIL NFR BLD: 2 % (ref 0–7)
ERYTHROCYTE [DISTWIDTH] IN BLOOD BY AUTOMATED COUNT: 13 % (ref 11.5–14.5)
EST. AVERAGE GLUCOSE BLD GHB EST-MCNC: 103 MG/DL
GLOBULIN SER CALC-MCNC: 3.3 G/DL (ref 2–4)
GLUCOSE SERPL-MCNC: 93 MG/DL (ref 65–100)
HBA1C MFR BLD: 5.2 % (ref 4–5.6)
HCT VFR BLD AUTO: 39.2 % (ref 35–47)
HDLC SERPL-MCNC: 75 MG/DL
HDLC SERPL: 2.5 (ref 0–5)
HGB BLD-MCNC: 13 G/DL (ref 11.5–16)
IMM GRANULOCYTES # BLD AUTO: 0 K/UL (ref 0–0.04)
IMM GRANULOCYTES NFR BLD AUTO: 0 % (ref 0–0.5)
LDLC SERPL CALC-MCNC: 90.2 MG/DL (ref 0–100)
LYMPHOCYTES # BLD: 1.7 K/UL (ref 0.8–3.5)
LYMPHOCYTES NFR BLD: 34 % (ref 12–49)
MCH RBC QN AUTO: 30.9 PG (ref 26–34)
MCHC RBC AUTO-ENTMCNC: 33.2 G/DL (ref 30–36.5)
MCV RBC AUTO: 93.1 FL (ref 80–99)
MICROALBUMIN UR-MCNC: 3.99 MG/DL
MICROALBUMIN/CREAT UR-RTO: 15 MG/G (ref 0–30)
MONOCYTES # BLD: 0.5 K/UL (ref 0–1)
MONOCYTES NFR BLD: 9 % (ref 5–13)
NEUTS SEG # BLD: 2.7 K/UL (ref 1.8–8)
NEUTS SEG NFR BLD: 54 % (ref 32–75)
NRBC # BLD: 0 K/UL (ref 0–0.01)
NRBC BLD-RTO: 0 PER 100 WBC
PLATELET # BLD AUTO: 153 K/UL (ref 150–400)
PMV BLD AUTO: 12.4 FL (ref 8.9–12.9)
POTASSIUM SERPL-SCNC: 3.7 MMOL/L (ref 3.5–5.1)
PROT SERPL-MCNC: 7.5 G/DL (ref 6.4–8.2)
RBC # BLD AUTO: 4.21 M/UL (ref 3.8–5.2)
SODIUM SERPL-SCNC: 142 MMOL/L (ref 136–145)
TRIGL SERPL-MCNC: 109 MG/DL
TSH SERPL DL<=0.05 MIU/L-ACNC: 1.34 UIU/ML (ref 0.36–3.74)
VIT B12 SERPL-MCNC: 353 PG/ML (ref 193–986)
VLDLC SERPL CALC-MCNC: 21.8 MG/DL
WBC # BLD AUTO: 5 K/UL (ref 3.6–11)

## 2025-01-10 ENCOUNTER — PATIENT MESSAGE (OUTPATIENT)
Facility: CLINIC | Age: 65
End: 2025-01-10

## 2025-01-13 RX ORDER — OMEPRAZOLE 40 MG/1
40 CAPSULE, DELAYED RELEASE ORAL 2 TIMES DAILY
Qty: 90 CAPSULE | Refills: 3 | Status: SHIPPED | OUTPATIENT
Start: 2025-01-13

## 2025-02-21 ENCOUNTER — OFFICE VISIT (OUTPATIENT)
Age: 65
End: 2025-02-21

## 2025-02-21 VITALS
HEIGHT: 61 IN | BODY MASS INDEX: 25.34 KG/M2 | DIASTOLIC BLOOD PRESSURE: 72 MMHG | WEIGHT: 134.25 LBS | SYSTOLIC BLOOD PRESSURE: 128 MMHG

## 2025-02-21 DIAGNOSIS — N95.2 VAGINAL ATROPHY: ICD-10-CM

## 2025-02-21 DIAGNOSIS — Z85.3 PERSONAL HISTORY OF BREAST CANCER: ICD-10-CM

## 2025-02-21 DIAGNOSIS — Z90.710 HISTORY OF HYSTERECTOMY: ICD-10-CM

## 2025-02-21 DIAGNOSIS — Z01.419 GYNECOLOGIC EXAM NORMAL: ICD-10-CM

## 2025-02-21 DIAGNOSIS — N64.4 MASTODYNIA OF LEFT BREAST: ICD-10-CM

## 2025-02-21 DIAGNOSIS — Z12.31 SCREENING MAMMOGRAM FOR BREAST CANCER: Primary | ICD-10-CM

## 2025-02-21 DIAGNOSIS — N39.0 RECURRENT UTI (URINARY TRACT INFECTION): ICD-10-CM

## 2025-02-21 RX ORDER — ESTRADIOL 0.1 MG/G
CREAM VAGINAL
Qty: 42.5 G | Refills: 3 | Status: SHIPPED | OUTPATIENT
Start: 2025-02-21

## 2025-02-21 ASSESSMENT — ENCOUNTER SYMPTOMS
GASTROINTESTINAL NEGATIVE: 1
RESPIRATORY NEGATIVE: 1

## 2025-02-21 NOTE — PROGRESS NOTES
Chief Complaint   Patient presents with    Annual Exam     [id number]     /72 (Site: Left Upper Arm, Position: Sitting, Cuff Size: Small Adult)   Ht 1.549 m (5' 1\")   Wt 60.9 kg (134 lb 4 oz)   BMI 25.37 kg/m²     
hysterectomy    Recurrent UTI (urinary tract infection)  -     estradiol (ESTRACE VAGINAL) 0.1 MG/GM vaginal cream; Apply pea size amount to affected area three times a week at night    Vaginal atrophy  -     estradiol (ESTRACE VAGINAL) 0.1 MG/GM vaginal cream; Apply pea size amount to affected area three times a week at night    Mastodynia of left breast    Will see if local ERT cream helps if not, refer to Urology  DWP Vit. E caps for mastodynia- mammo in 2024 normal    Plan: Questions addressed  Counseled re: diet, exercise, healthy lifestyle  Return for Annual  Rec annual mammogram  Rec: Colonoscopy

## 2025-02-28 ENCOUNTER — HOSPITAL ENCOUNTER (OUTPATIENT)
Facility: HOSPITAL | Age: 65
Discharge: HOME | End: 2025-02-28
Attending: OBSTETRICS & GYNECOLOGY
Payer: COMMERCIAL

## 2025-02-28 DIAGNOSIS — Z12.31 SCREENING MAMMOGRAM FOR BREAST CANCER: ICD-10-CM

## 2025-02-28 PROCEDURE — 77063 BREAST TOMOSYNTHESIS BI: CPT

## 2025-07-07 RX ORDER — OMEPRAZOLE 40 MG/1
CAPSULE, DELAYED RELEASE ORAL
Qty: 90 CAPSULE | Refills: 3 | Status: SHIPPED | OUTPATIENT
Start: 2025-07-07

## 2025-07-08 ENCOUNTER — TELEPHONE (OUTPATIENT)
Facility: CLINIC | Age: 65
End: 2025-07-08

## 2025-07-08 NOTE — TELEPHONE ENCOUNTER
Called patient to reschedule her appointment on 12.1.25, left voicemail on both mobile and home number to call us back.